# Patient Record
Sex: FEMALE | Race: WHITE | NOT HISPANIC OR LATINO | Employment: OTHER | URBAN - METROPOLITAN AREA
[De-identification: names, ages, dates, MRNs, and addresses within clinical notes are randomized per-mention and may not be internally consistent; named-entity substitution may affect disease eponyms.]

---

## 2017-01-13 ENCOUNTER — HOSPITAL ENCOUNTER (OUTPATIENT)
Facility: HOSPITAL | Age: 37
End: 2017-01-13

## 2017-06-07 ENCOUNTER — APPOINTMENT (OUTPATIENT)
Dept: LAB | Facility: HOSPITAL | Age: 37
End: 2017-06-07

## 2017-06-07 ENCOUNTER — TRANSCRIBE ORDERS (OUTPATIENT)
Dept: LAB | Facility: HOSPITAL | Age: 37
End: 2017-06-07

## 2017-06-07 DIAGNOSIS — N92.6 MISSED PERIOD: Primary | ICD-10-CM

## 2017-06-07 LAB
HCG INTACT+B SERPL-ACNC: NORMAL MIU/ML
PROGEST SERPL-MCNC: 29.46 NG/ML

## 2017-06-07 PROCEDURE — 84702 CHORIONIC GONADOTROPIN TEST: CPT | Performed by: PHYSICIAN ASSISTANT

## 2017-06-07 PROCEDURE — 84144 ASSAY OF PROGESTERONE: CPT | Performed by: PHYSICIAN ASSISTANT

## 2017-06-07 PROCEDURE — 36415 COLL VENOUS BLD VENIPUNCTURE: CPT | Performed by: PHYSICIAN ASSISTANT

## 2017-07-12 ENCOUNTER — LAB (OUTPATIENT)
Dept: LAB | Facility: HOSPITAL | Age: 37
End: 2017-07-12

## 2017-07-12 ENCOUNTER — TRANSCRIBE ORDERS (OUTPATIENT)
Dept: LAB | Facility: HOSPITAL | Age: 37
End: 2017-07-12

## 2017-07-12 DIAGNOSIS — Z34.81 PRENATAL CARE, SUBSEQUENT PREGNANCY, FIRST TRIMESTER: ICD-10-CM

## 2017-07-12 DIAGNOSIS — Z3A.10 10 WEEKS GESTATION OF PREGNANCY: Primary | ICD-10-CM

## 2017-07-12 DIAGNOSIS — Z3A.10 10 WEEKS GESTATION OF PREGNANCY: ICD-10-CM

## 2017-07-12 LAB
ABO GROUP BLD: NORMAL
AMPHET+METHAMPHET UR QL: NEGATIVE
AMPHETAMINES UR QL: NEGATIVE
BACTERIA UR QL AUTO: ABNORMAL /HPF
BARBITURATES UR QL SCN: NEGATIVE
BASOPHILS # BLD AUTO: 0.01 10*3/MM3 (ref 0–0.2)
BASOPHILS NFR BLD AUTO: 0.2 % (ref 0–1)
BENZODIAZ UR QL SCN: NEGATIVE
BILIRUB UR QL STRIP: NEGATIVE
BILIRUB UR QL STRIP: NEGATIVE
BLD GP AB SCN SERPL QL: NEGATIVE
BUPRENORPHINE SERPL-MCNC: NEGATIVE NG/ML
CANNABINOIDS SERPL QL: NEGATIVE
CLARITY UR: CLEAR
CLARITY UR: CLEAR
COCAINE UR QL: NEGATIVE
COLOR UR: YELLOW
COLOR UR: YELLOW
DEPRECATED RDW RBC AUTO: 42.2 FL (ref 37–54)
EOSINOPHIL # BLD AUTO: 0.04 10*3/MM3 (ref 0–0.3)
EOSINOPHIL NFR BLD AUTO: 1 % (ref 0–3)
ERYTHROCYTE [DISTWIDTH] IN BLOOD BY AUTOMATED COUNT: 12.3 % (ref 11.3–14.5)
EXTERNAL HEPATITIS B SURFACE ANTIGEN: NEGATIVE
EXTERNAL HEPATITIS C, RNA QUANT PCR: NON REACTIVE
EXTERNAL RUBELLA QUALITATIVE: NORMAL
EXTERNAL SYPHILIS RPR SCREEN: NORMAL
EXTERNAL URINE DRUG SCREEN: NEGATIVE
GLUCOSE BLD-MCNC: 95 MG/DL (ref 70–100)
GLUCOSE UR STRIP-MCNC: NEGATIVE MG/DL
GLUCOSE UR STRIP-MCNC: NEGATIVE MG/DL
HBV SURFACE AG SERPL QL IA: NORMAL
HCT VFR BLD AUTO: 40.3 % (ref 34.5–44)
HCV AB SER DONR QL: NORMAL
HGB BLD-MCNC: 13.1 G/DL (ref 11.5–15.5)
HGB UR QL STRIP.AUTO: NEGATIVE
HGB UR QL STRIP.AUTO: NEGATIVE
HIV1 P24 AG SERPL QL IA: NORMAL
HIV1+2 AB SER QL: NORMAL
HYALINE CASTS UR QL AUTO: ABNORMAL /LPF
IMM GRANULOCYTES # BLD: 0.01 10*3/MM3 (ref 0–0.03)
IMM GRANULOCYTES NFR BLD: 0.2 % (ref 0–0.6)
KETONES UR QL STRIP: NEGATIVE
KETONES UR QL STRIP: NEGATIVE
LEUKOCYTE ESTERASE UR QL STRIP.AUTO: ABNORMAL
LEUKOCYTE ESTERASE UR QL STRIP.AUTO: ABNORMAL
LYMPHOCYTES # BLD AUTO: 1.24 10*3/MM3 (ref 0.6–4.8)
LYMPHOCYTES NFR BLD AUTO: 30.7 % (ref 24–44)
MCH RBC QN AUTO: 30.3 PG (ref 27–31)
MCHC RBC AUTO-ENTMCNC: 32.5 G/DL (ref 32–36)
MCV RBC AUTO: 93.1 FL (ref 80–99)
METHADONE UR QL SCN: NEGATIVE
MONOCYTES # BLD AUTO: 0.34 10*3/MM3 (ref 0–1)
MONOCYTES NFR BLD AUTO: 8.4 % (ref 0–12)
NEUTROPHILS # BLD AUTO: 2.4 10*3/MM3 (ref 1.5–8.3)
NEUTROPHILS NFR BLD AUTO: 59.5 % (ref 41–71)
NITRITE UR QL STRIP: NEGATIVE
NITRITE UR QL STRIP: NEGATIVE
OPIATES UR QL: NEGATIVE
OXYCODONE UR QL SCN: NEGATIVE
PCP UR QL SCN: NEGATIVE
PH UR STRIP.AUTO: 7.5 [PH] (ref 5–8)
PH UR STRIP.AUTO: 7.5 [PH] (ref 5–8)
PLATELET # BLD AUTO: 157 10*3/MM3 (ref 150–450)
PMV BLD AUTO: 10.6 FL (ref 6–12)
PROPOXYPH UR QL: NEGATIVE
PROT UR QL STRIP: NEGATIVE
PROT UR QL STRIP: NEGATIVE
RBC # BLD AUTO: 4.33 10*6/MM3 (ref 3.89–5.14)
RBC # UR: ABNORMAL /HPF
REF LAB TEST METHOD: ABNORMAL
RH BLD: POSITIVE
RUBV IGG SER QL: NORMAL
RUBV IGG SER-ACNC: 14 IU/ML
SP GR UR STRIP: <=1.005 (ref 1–1.03)
SP GR UR STRIP: <=1.005 (ref 1–1.03)
SQUAMOUS #/AREA URNS HPF: ABNORMAL /HPF
TRICYCLICS UR QL SCN: NEGATIVE
UROBILINOGEN UR QL STRIP: ABNORMAL
UROBILINOGEN UR QL STRIP: ABNORMAL
WBC NRBC COR # BLD: 4.04 10*3/MM3 (ref 3.5–10.8)
WBC UR QL AUTO: ABNORMAL /HPF

## 2017-07-12 PROCEDURE — 82947 ASSAY GLUCOSE BLOOD QUANT: CPT | Performed by: OBSTETRICS & GYNECOLOGY

## 2017-07-12 PROCEDURE — 80081 OBSTETRIC PANEL INC HIV TSTG: CPT | Performed by: OBSTETRICS & GYNECOLOGY

## 2017-07-12 PROCEDURE — 36415 COLL VENOUS BLD VENIPUNCTURE: CPT

## 2017-07-12 PROCEDURE — 86850 RBC ANTIBODY SCREEN: CPT | Performed by: OBSTETRICS & GYNECOLOGY

## 2017-07-12 PROCEDURE — 85025 COMPLETE CBC W/AUTO DIFF WBC: CPT | Performed by: OBSTETRICS & GYNECOLOGY

## 2017-07-12 PROCEDURE — 80306 DRUG TEST PRSMV INSTRMNT: CPT | Performed by: OBSTETRICS & GYNECOLOGY

## 2017-07-12 PROCEDURE — 87086 URINE CULTURE/COLONY COUNT: CPT | Performed by: OBSTETRICS & GYNECOLOGY

## 2017-07-12 PROCEDURE — 87340 HEPATITIS B SURFACE AG IA: CPT | Performed by: OBSTETRICS & GYNECOLOGY

## 2017-07-12 PROCEDURE — 86803 HEPATITIS C AB TEST: CPT | Performed by: OBSTETRICS & GYNECOLOGY

## 2017-07-12 PROCEDURE — 86900 BLOOD TYPING SEROLOGIC ABO: CPT | Performed by: OBSTETRICS & GYNECOLOGY

## 2017-07-12 PROCEDURE — 81001 URINALYSIS AUTO W/SCOPE: CPT | Performed by: OBSTETRICS & GYNECOLOGY

## 2017-07-12 PROCEDURE — 86901 BLOOD TYPING SEROLOGIC RH(D): CPT | Performed by: OBSTETRICS & GYNECOLOGY

## 2017-07-12 PROCEDURE — 81003 URINALYSIS AUTO W/O SCOPE: CPT | Performed by: OBSTETRICS & GYNECOLOGY

## 2017-07-12 PROCEDURE — G0432 EIA HIV-1/HIV-2 SCREEN: HCPCS | Performed by: OBSTETRICS & GYNECOLOGY

## 2017-07-14 LAB
BACTERIA SPEC AEROBE CULT: NORMAL
RPR SER QL: NORMAL

## 2017-11-15 ENCOUNTER — LAB (OUTPATIENT)
Dept: LAB | Facility: HOSPITAL | Age: 37
End: 2017-11-15

## 2017-11-15 ENCOUNTER — TRANSCRIBE ORDERS (OUTPATIENT)
Dept: LAB | Facility: HOSPITAL | Age: 37
End: 2017-11-15

## 2017-11-15 DIAGNOSIS — Z34.83 PRENATAL CARE, SUBSEQUENT PREGNANCY, THIRD TRIMESTER: ICD-10-CM

## 2017-11-15 DIAGNOSIS — Z3A.28 28 WEEKS GESTATION OF PREGNANCY: Primary | ICD-10-CM

## 2017-11-15 DIAGNOSIS — Z3A.28 28 WEEKS GESTATION OF PREGNANCY: ICD-10-CM

## 2017-11-15 LAB
BLD GP AB SCN SERPL QL: NEGATIVE
DEPRECATED RDW RBC AUTO: 46.5 FL (ref 37–54)
ERYTHROCYTE [DISTWIDTH] IN BLOOD BY AUTOMATED COUNT: 13.6 % (ref 11.3–14.5)
EXTERNAL GTT 1 HOUR: 139
GLUCOSE 1H P 100 G GLC PO SERPL-MCNC: 122 MG/DL (ref 65–199)
GLUCOSE BLDC-MCNC: 139 MG/DL (ref 75–125)
HCT VFR BLD AUTO: 37.1 % (ref 34.5–44)
HGB BLD-MCNC: 12.1 G/DL (ref 11.5–15.5)
MCH RBC QN AUTO: 30.2 PG (ref 27–31)
MCHC RBC AUTO-ENTMCNC: 32.6 G/DL (ref 32–36)
MCV RBC AUTO: 92.5 FL (ref 80–99)
PLATELET # BLD AUTO: 143 10*3/MM3 (ref 150–450)
PMV BLD AUTO: 10.6 FL (ref 6–12)
RBC # BLD AUTO: 4.01 10*6/MM3 (ref 3.89–5.14)
WBC NRBC COR # BLD: 7.67 10*3/MM3 (ref 3.5–10.8)

## 2017-11-15 PROCEDURE — 86850 RBC ANTIBODY SCREEN: CPT | Performed by: OBSTETRICS & GYNECOLOGY

## 2017-11-15 PROCEDURE — 82950 GLUCOSE TEST: CPT | Performed by: OBSTETRICS & GYNECOLOGY

## 2017-11-15 PROCEDURE — 85027 COMPLETE CBC AUTOMATED: CPT | Performed by: OBSTETRICS & GYNECOLOGY

## 2017-11-15 PROCEDURE — 82962 GLUCOSE BLOOD TEST: CPT | Performed by: OBSTETRICS & GYNECOLOGY

## 2017-11-15 PROCEDURE — 36415 COLL VENOUS BLD VENIPUNCTURE: CPT | Performed by: OBSTETRICS & GYNECOLOGY

## 2018-01-15 LAB — EXTERNAL GROUP B STREP ANTIGEN: NEGATIVE

## 2018-01-30 ENCOUNTER — HOSPITAL ENCOUNTER (OUTPATIENT)
Facility: HOSPITAL | Age: 38
Setting detail: OBSERVATION
Discharge: HOME | End: 2018-01-30
Attending: OBSTETRICS & GYNECOLOGY | Admitting: OBSTETRICS & GYNECOLOGY

## 2018-01-30 VITALS
BODY MASS INDEX: 26.46 KG/M2 | RESPIRATION RATE: 16 BRPM | TEMPERATURE: 97.9 F | SYSTOLIC BLOOD PRESSURE: 135 MMHG | HEART RATE: 88 BPM | HEIGHT: 64 IN | WEIGHT: 155 LBS | DIASTOLIC BLOOD PRESSURE: 64 MMHG

## 2018-01-30 PROBLEM — Z34.90 PREGNANCY: Status: ACTIVE | Noted: 2018-01-30

## 2018-01-30 PROCEDURE — G0378 HOSPITAL OBSERVATION PER HR: HCPCS

## 2018-01-30 PROCEDURE — 59025 FETAL NON-STRESS TEST: CPT

## 2018-01-30 RX ORDER — MISOPROSTOL 200 UG/1
800 TABLET ORAL AS NEEDED
Status: CANCELLED | OUTPATIENT
Start: 2018-01-30

## 2018-01-30 RX ORDER — PRENATAL WITH FERROUS FUM AND FOLIC ACID 3080; 920; 120; 400; 22; 1.84; 3; 20; 10; 1; 12; 200; 27; 25; 2 [IU]/1; [IU]/1; MG/1; [IU]/1; MG/1; MG/1; MG/1; MG/1; MG/1; MG/1; UG/1; MG/1; MG/1; MG/1; MG/1
1 TABLET ORAL DAILY
COMMUNITY
End: 2019-06-17

## 2018-01-30 RX ORDER — CARBOPROST TROMETHAMINE 250 UG/ML
250 INJECTION, SOLUTION INTRAMUSCULAR AS NEEDED
Status: CANCELLED | OUTPATIENT
Start: 2018-01-30

## 2018-01-30 RX ORDER — SODIUM CHLORIDE 0.9 % (FLUSH) 0.9 %
1-10 SYRINGE (ML) INJECTION AS NEEDED
Status: CANCELLED | OUTPATIENT
Start: 2018-01-30

## 2018-01-30 RX ORDER — OXYTOCIN/RINGER'S LACTATE 20/1000 ML
125 PLASTIC BAG, INJECTION (ML) INTRAVENOUS CONTINUOUS PRN
Status: CANCELLED | OUTPATIENT
Start: 2018-01-30

## 2018-01-30 RX ORDER — LIDOCAINE HYDROCHLORIDE 10 MG/ML
5 INJECTION, SOLUTION INFILTRATION; PERINEURAL AS NEEDED
Status: CANCELLED | OUTPATIENT
Start: 2018-01-30

## 2018-01-30 RX ORDER — OXYTOCIN/RINGER'S LACTATE 20/1000 ML
999 PLASTIC BAG, INJECTION (ML) INTRAVENOUS ONCE
Status: CANCELLED | OUTPATIENT
Start: 2018-01-30

## 2018-01-30 RX ORDER — METHYLERGONOVINE MALEATE 0.2 MG/ML
200 INJECTION INTRAVENOUS ONCE AS NEEDED
Status: CANCELLED | OUTPATIENT
Start: 2018-01-30

## 2018-01-30 RX ORDER — SODIUM CHLORIDE, SODIUM LACTATE, POTASSIUM CHLORIDE, CALCIUM CHLORIDE 600; 310; 30; 20 MG/100ML; MG/100ML; MG/100ML; MG/100ML
125 INJECTION, SOLUTION INTRAVENOUS CONTINUOUS
Status: CANCELLED | OUTPATIENT
Start: 2018-01-30

## 2018-01-30 RX ORDER — ALBUTEROL SULFATE 2.5 MG/3ML
2.5 SOLUTION RESPIRATORY (INHALATION) EVERY 4 HOURS PRN
COMMUNITY

## 2018-02-05 ENCOUNTER — APPOINTMENT (OUTPATIENT)
Dept: GENERAL RADIOLOGY | Facility: HOSPITAL | Age: 38
End: 2018-02-05

## 2018-02-05 ENCOUNTER — ANESTHESIA EVENT (OUTPATIENT)
Dept: LABOR AND DELIVERY | Facility: HOSPITAL | Age: 38
End: 2018-02-05

## 2018-02-05 ENCOUNTER — ANESTHESIA (OUTPATIENT)
Dept: LABOR AND DELIVERY | Facility: HOSPITAL | Age: 38
End: 2018-02-05

## 2018-02-05 ENCOUNTER — HOSPITAL ENCOUNTER (INPATIENT)
Dept: LABOR AND DELIVERY | Facility: HOSPITAL | Age: 38
LOS: 2 days | Discharge: HOME | End: 2018-02-07
Attending: OBSTETRICS & GYNECOLOGY | Admitting: OBSTETRICS & GYNECOLOGY

## 2018-02-05 DIAGNOSIS — O48.0 POSTMATURITY PREGNANCY, 40-42 WEEKS GESTATION: Primary | ICD-10-CM

## 2018-02-05 PROBLEM — Z34.90 PREGNANCY: Status: RESOLVED | Noted: 2018-01-30 | Resolved: 2018-02-05

## 2018-02-05 LAB
ABO GROUP BLD: NORMAL
ATMOSPHERIC PRESS: ABNORMAL MMHG
ATMOSPHERIC PRESS: ABNORMAL MMHG
BASE EXCESS BLDCOA CALC-SCNC: -6.6 MMOL/L (ref 0–2)
BASE EXCESS BLDCOV CALC-SCNC: -6 MMOL/L (ref 0–2)
BDY SITE: ABNORMAL
BLD GP AB SCN SERPL QL: NEGATIVE
CO2 BLDA-SCNC: 22.9 MMOL/L (ref 22–33)
CO2 BLDA-SCNC: 26.5 MMOL/L (ref 22–33)
DEPRECATED RDW RBC AUTO: 44.7 FL (ref 37–54)
ERYTHROCYTE [DISTWIDTH] IN BLOOD BY AUTOMATED COUNT: 13.7 % (ref 11.3–14.5)
HCO3 BLDCOA-SCNC: 24.4 MMOL/L (ref 16.9–20.5)
HCO3 BLDCOV-SCNC: 21.5 MMOL/L (ref 18.6–21.4)
HCT VFR BLD AUTO: 36.1 % (ref 34.5–44)
HGB BLD-MCNC: 12.2 G/DL (ref 11.5–15.5)
HGB BLDA-MCNC: 16.9 G/DL (ref 14–18)
HGB BLDA-MCNC: 16.9 G/DL (ref 14–18)
HOROWITZ INDEX BLD+IHG-RTO: 21 %
HOROWITZ INDEX BLD+IHG-RTO: 21 %
MCH RBC QN AUTO: 29.9 PG (ref 27–31)
MCHC RBC AUTO-ENTMCNC: 33.8 G/DL (ref 32–36)
MCV RBC AUTO: 88.5 FL (ref 80–99)
MODALITY: ABNORMAL
MODALITY: ABNORMAL
PCO2 BLDCOA: 71 MMHG (ref 43.3–54.9)
PCO2 BLDCOV: 48 MM HG (ref 30–60)
PH BLDCOA: 7.14 PH UNITS (ref 7.2–7.3)
PH BLDCOV: 7.26 PH UNITS (ref 7.19–7.46)
PLATELET # BLD AUTO: 122 10*3/MM3 (ref 150–450)
PMV BLD AUTO: 11.5 FL (ref 6–12)
PO2 BLDCOA: 11.4 MMHG (ref 11.5–43.3)
PO2 BLDCOV: 20.7 MM HG
RBC # BLD AUTO: 4.08 10*6/MM3 (ref 3.89–5.14)
RH BLD: POSITIVE
SAO2 % BLDCOA: 11.2 %
SAO2 % BLDCOA: ABNORMAL % (ref 45–75)
SAO2 % BLDCOV: 38.7 %
WBC NRBC COR # BLD: 6.68 10*3/MM3 (ref 3.5–10.8)

## 2018-02-05 PROCEDURE — 74018 RADEX ABDOMEN 1 VIEW: CPT

## 2018-02-05 PROCEDURE — 25010000002 MIDAZOLAM PER 1 MG: Performed by: ANESTHESIOLOGY

## 2018-02-05 PROCEDURE — 25010000002 CHLOROPROCAINE HCL (PF) 3 % SOLUTION: Performed by: ANESTHESIOLOGY

## 2018-02-05 PROCEDURE — 59514 CESAREAN DELIVERY ONLY: CPT | Performed by: OBSTETRICS & GYNECOLOGY

## 2018-02-05 PROCEDURE — 25010000002 DIPHENHYDRAMINE PER 50 MG: Performed by: OBSTETRICS & GYNECOLOGY

## 2018-02-05 PROCEDURE — 25010000002 FENTANYL CITRATE (PF) 100 MCG/2ML SOLUTION: Performed by: ANESTHESIOLOGY

## 2018-02-05 PROCEDURE — 82805 BLOOD GASES W/O2 SATURATION: CPT | Performed by: OBSTETRICS & GYNECOLOGY

## 2018-02-05 PROCEDURE — 3E033VJ INTRODUCTION OF OTHER HORMONE INTO PERIPHERAL VEIN, PERCUTANEOUS APPROACH: ICD-10-PCS | Performed by: OBSTETRICS & GYNECOLOGY

## 2018-02-05 PROCEDURE — C1755 CATHETER, INTRASPINAL: HCPCS

## 2018-02-05 PROCEDURE — 25010000002 METHYLERGONOVINE MALEATE PER 0.2 MG: Performed by: ANESTHESIOLOGY

## 2018-02-05 PROCEDURE — 85027 COMPLETE CBC AUTOMATED: CPT | Performed by: OBSTETRICS & GYNECOLOGY

## 2018-02-05 PROCEDURE — 10907ZC DRAINAGE OF AMNIOTIC FLUID, THERAPEUTIC FROM PRODUCTS OF CONCEPTION, VIA NATURAL OR ARTIFICIAL OPENING: ICD-10-PCS | Performed by: OBSTETRICS & GYNECOLOGY

## 2018-02-05 PROCEDURE — C1755 CATHETER, INTRASPINAL: HCPCS | Performed by: ANESTHESIOLOGY

## 2018-02-05 PROCEDURE — 86850 RBC ANTIBODY SCREEN: CPT | Performed by: OBSTETRICS & GYNECOLOGY

## 2018-02-05 PROCEDURE — 25010000003 MORPHINE PER 10 MG: Performed by: ANESTHESIOLOGY

## 2018-02-05 PROCEDURE — 25010000002 ROPIVACAINE PER 1 MG: Performed by: ANESTHESIOLOGY

## 2018-02-05 PROCEDURE — 25010000002 ONDANSETRON PER 1 MG: Performed by: ANESTHESIOLOGY

## 2018-02-05 PROCEDURE — 59025 FETAL NON-STRESS TEST: CPT

## 2018-02-05 PROCEDURE — 86900 BLOOD TYPING SEROLOGIC ABO: CPT | Performed by: OBSTETRICS & GYNECOLOGY

## 2018-02-05 PROCEDURE — 86901 BLOOD TYPING SEROLOGIC RH(D): CPT | Performed by: OBSTETRICS & GYNECOLOGY

## 2018-02-05 RX ORDER — HYDROCODONE BITARTRATE AND ACETAMINOPHEN 5; 325 MG/1; MG/1
1 TABLET ORAL EVERY 4 HOURS PRN
Status: DISCONTINUED | OUTPATIENT
Start: 2018-02-05 | End: 2018-02-07 | Stop reason: HOSPADM

## 2018-02-05 RX ORDER — ONDANSETRON 4 MG/1
4 TABLET, FILM COATED ORAL EVERY 6 HOURS PRN
Status: DISCONTINUED | OUTPATIENT
Start: 2018-02-05 | End: 2018-02-05

## 2018-02-05 RX ORDER — OXYTOCIN/RINGER'S LACTATE 20/1000 ML
125 PLASTIC BAG, INJECTION (ML) INTRAVENOUS CONTINUOUS PRN
Status: DISCONTINUED | OUTPATIENT
Start: 2018-02-05 | End: 2018-02-05 | Stop reason: HOSPADM

## 2018-02-05 RX ORDER — ONDANSETRON 2 MG/ML
4 INJECTION INTRAMUSCULAR; INTRAVENOUS ONCE AS NEEDED
Status: DISCONTINUED | OUTPATIENT
Start: 2018-02-05 | End: 2018-02-05 | Stop reason: HOSPADM

## 2018-02-05 RX ORDER — MIDAZOLAM HYDROCHLORIDE 1 MG/ML
INJECTION INTRAMUSCULAR; INTRAVENOUS AS NEEDED
Status: DISCONTINUED | OUTPATIENT
Start: 2018-02-05 | End: 2018-02-05 | Stop reason: SURG

## 2018-02-05 RX ORDER — MORPHINE SULFATE 2 MG/ML
1 INJECTION, SOLUTION INTRAMUSCULAR; INTRAVENOUS EVERY 4 HOURS PRN
Status: DISCONTINUED | OUTPATIENT
Start: 2018-02-05 | End: 2018-02-07 | Stop reason: HOSPADM

## 2018-02-05 RX ORDER — ACETAMINOPHEN 325 MG/1
650 TABLET ORAL EVERY 4 HOURS PRN
Status: DISCONTINUED | OUTPATIENT
Start: 2018-02-05 | End: 2018-02-05

## 2018-02-05 RX ORDER — METHYLERGONOVINE MALEATE 0.2 MG/ML
INJECTION INTRAVENOUS AS NEEDED
Status: DISCONTINUED | OUTPATIENT
Start: 2018-02-05 | End: 2018-02-05 | Stop reason: SURG

## 2018-02-05 RX ORDER — HYDROMORPHONE HYDROCHLORIDE 1 MG/ML
0.5 INJECTION, SOLUTION INTRAMUSCULAR; INTRAVENOUS; SUBCUTANEOUS
Status: ACTIVE | OUTPATIENT
Start: 2018-02-05 | End: 2018-02-06

## 2018-02-05 RX ORDER — CHLOROPROCAINE HYDROCHLORIDE 30 MG/ML
INJECTION, SOLUTION EPIDURAL; INFILTRATION; INTRACAUDAL; PERINEURAL AS NEEDED
Status: DISCONTINUED | OUTPATIENT
Start: 2018-02-05 | End: 2018-02-05 | Stop reason: SURG

## 2018-02-05 RX ORDER — DIPHENHYDRAMINE HCL 25 MG
25 CAPSULE ORAL EVERY 6 HOURS PRN
Status: DISCONTINUED | OUTPATIENT
Start: 2018-02-05 | End: 2018-02-07 | Stop reason: HOSPADM

## 2018-02-05 RX ORDER — MONTELUKAST SODIUM 10 MG/1
10 TABLET ORAL NIGHTLY
COMMUNITY
End: 2019-06-17

## 2018-02-05 RX ORDER — NALOXONE HCL 0.4 MG/ML
0.4 VIAL (ML) INJECTION
Status: DISCONTINUED | OUTPATIENT
Start: 2018-02-05 | End: 2018-02-07 | Stop reason: HOSPADM

## 2018-02-05 RX ORDER — DIPHENHYDRAMINE HYDROCHLORIDE 50 MG/ML
12.5 INJECTION INTRAMUSCULAR; INTRAVENOUS EVERY 8 HOURS PRN
Status: DISCONTINUED | OUTPATIENT
Start: 2018-02-05 | End: 2018-02-05 | Stop reason: HOSPADM

## 2018-02-05 RX ORDER — IBUPROFEN 600 MG/1
600 TABLET ORAL ONCE AS NEEDED
Status: DISCONTINUED | OUTPATIENT
Start: 2018-02-05 | End: 2018-02-07 | Stop reason: HOSPADM

## 2018-02-05 RX ORDER — DIPHENHYDRAMINE HYDROCHLORIDE 50 MG/ML
25 INJECTION INTRAMUSCULAR; INTRAVENOUS EVERY 6 HOURS PRN
Status: DISCONTINUED | OUTPATIENT
Start: 2018-02-05 | End: 2018-02-05

## 2018-02-05 RX ORDER — ROPIVACAINE HYDROCHLORIDE 2 MG/ML
15 INJECTION, SOLUTION EPIDURAL; INFILTRATION; PERINEURAL CONTINUOUS
Status: DISCONTINUED | OUTPATIENT
Start: 2018-02-05 | End: 2018-02-05

## 2018-02-05 RX ORDER — PROMETHAZINE HYDROCHLORIDE 25 MG/ML
12.5 INJECTION, SOLUTION INTRAMUSCULAR; INTRAVENOUS EVERY 6 HOURS PRN
Status: DISCONTINUED | OUTPATIENT
Start: 2018-02-05 | End: 2018-02-07 | Stop reason: HOSPADM

## 2018-02-05 RX ORDER — ACETAMINOPHEN 325 MG/1
650 TABLET ORAL ONCE AS NEEDED
Status: DISCONTINUED | OUTPATIENT
Start: 2018-02-05 | End: 2018-02-07 | Stop reason: HOSPADM

## 2018-02-05 RX ORDER — DIPHENHYDRAMINE HYDROCHLORIDE 50 MG/ML
12.5 INJECTION INTRAMUSCULAR; INTRAVENOUS EVERY 6 HOURS PRN
Status: DISCONTINUED | OUTPATIENT
Start: 2018-02-05 | End: 2018-02-07 | Stop reason: HOSPADM

## 2018-02-05 RX ORDER — METOCLOPRAMIDE HYDROCHLORIDE 5 MG/ML
10 INJECTION INTRAMUSCULAR; INTRAVENOUS ONCE AS NEEDED
Status: DISCONTINUED | OUTPATIENT
Start: 2018-02-05 | End: 2018-02-05 | Stop reason: HOSPADM

## 2018-02-05 RX ORDER — SIMETHICONE 80 MG
80 TABLET,CHEWABLE ORAL
Status: DISCONTINUED | OUTPATIENT
Start: 2018-02-05 | End: 2018-02-07 | Stop reason: HOSPADM

## 2018-02-05 RX ORDER — ONDANSETRON 2 MG/ML
4 INJECTION INTRAMUSCULAR; INTRAVENOUS ONCE
Status: DISCONTINUED | OUTPATIENT
Start: 2018-02-05 | End: 2018-02-07 | Stop reason: HOSPADM

## 2018-02-05 RX ORDER — METHYLERGONOVINE MALEATE 0.2 MG/ML
200 INJECTION INTRAVENOUS ONCE AS NEEDED
Status: DISCONTINUED | OUTPATIENT
Start: 2018-02-05 | End: 2018-02-07 | Stop reason: HOSPADM

## 2018-02-05 RX ORDER — LIDOCAINE HYDROCHLORIDE 10 MG/ML
5 INJECTION, SOLUTION EPIDURAL; INFILTRATION; INTRACAUDAL; PERINEURAL AS NEEDED
Status: DISCONTINUED | OUTPATIENT
Start: 2018-02-05 | End: 2018-02-05

## 2018-02-05 RX ORDER — OXYTOCIN-SODIUM CHLORIDE 0.9% IV SOLN 30 UNIT/500ML 30-0.9/5 UT/ML-%
2-30 SOLUTION INTRAVENOUS
Status: DISCONTINUED | OUTPATIENT
Start: 2018-02-05 | End: 2018-02-05

## 2018-02-05 RX ORDER — IBUPROFEN 600 MG/1
600 TABLET ORAL EVERY 6 HOURS PRN
Status: DISCONTINUED | OUTPATIENT
Start: 2018-02-05 | End: 2018-02-07 | Stop reason: HOSPADM

## 2018-02-05 RX ORDER — SODIUM CHLORIDE 0.9 % (FLUSH) 0.9 %
1-10 SYRINGE (ML) INJECTION AS NEEDED
Status: DISCONTINUED | OUTPATIENT
Start: 2018-02-05 | End: 2018-02-05

## 2018-02-05 RX ORDER — ONDANSETRON 2 MG/ML
INJECTION INTRAMUSCULAR; INTRAVENOUS AS NEEDED
Status: DISCONTINUED | OUTPATIENT
Start: 2018-02-05 | End: 2018-02-05 | Stop reason: SURG

## 2018-02-05 RX ORDER — OXYCODONE HYDROCHLORIDE AND ACETAMINOPHEN 5; 325 MG/1; MG/1
1 TABLET ORAL EVERY 4 HOURS PRN
Status: DISCONTINUED | OUTPATIENT
Start: 2018-02-05 | End: 2018-02-07 | Stop reason: HOSPADM

## 2018-02-05 RX ORDER — ACETAMINOPHEN 325 MG/1
650 TABLET ORAL EVERY 4 HOURS PRN
Status: DISCONTINUED | OUTPATIENT
Start: 2018-02-05 | End: 2018-02-07 | Stop reason: HOSPADM

## 2018-02-05 RX ORDER — PROMETHAZINE HYDROCHLORIDE 12.5 MG/1
12.5 SUPPOSITORY RECTAL EVERY 6 HOURS PRN
Status: DISCONTINUED | OUTPATIENT
Start: 2018-02-05 | End: 2018-02-07 | Stop reason: HOSPADM

## 2018-02-05 RX ORDER — LIDOCAINE HYDROCHLORIDE AND EPINEPHRINE 20; 5 MG/ML; UG/ML
INJECTION, SOLUTION EPIDURAL; INFILTRATION; INTRACAUDAL; PERINEURAL AS NEEDED
Status: DISCONTINUED | OUTPATIENT
Start: 2018-02-05 | End: 2018-02-05 | Stop reason: SURG

## 2018-02-05 RX ORDER — SODIUM CHLORIDE, SODIUM LACTATE, POTASSIUM CHLORIDE, CALCIUM CHLORIDE 600; 310; 30; 20 MG/100ML; MG/100ML; MG/100ML; MG/100ML
125 INJECTION, SOLUTION INTRAVENOUS CONTINUOUS
Status: DISCONTINUED | OUTPATIENT
Start: 2018-02-05 | End: 2018-02-05 | Stop reason: SDUPTHER

## 2018-02-05 RX ORDER — EPHEDRINE SULFATE 50 MG/ML
10 INJECTION, SOLUTION INTRAVENOUS
Status: DISCONTINUED | OUTPATIENT
Start: 2018-02-05 | End: 2018-02-05 | Stop reason: HOSPADM

## 2018-02-05 RX ORDER — LIDOCAINE HYDROCHLORIDE AND EPINEPHRINE 15; 5 MG/ML; UG/ML
INJECTION, SOLUTION EPIDURAL AS NEEDED
Status: DISCONTINUED | OUTPATIENT
Start: 2018-02-05 | End: 2018-02-05 | Stop reason: SURG

## 2018-02-05 RX ORDER — METOCLOPRAMIDE HYDROCHLORIDE 5 MG/ML
10 INJECTION INTRAMUSCULAR; INTRAVENOUS ONCE AS NEEDED
Status: DISCONTINUED | OUTPATIENT
Start: 2018-02-05 | End: 2018-02-07 | Stop reason: HOSPADM

## 2018-02-05 RX ORDER — CARBOPROST TROMETHAMINE 250 UG/ML
250 INJECTION, SOLUTION INTRAMUSCULAR AS NEEDED
Status: DISCONTINUED | OUTPATIENT
Start: 2018-02-05 | End: 2018-02-07 | Stop reason: HOSPADM

## 2018-02-05 RX ORDER — FAMOTIDINE 10 MG/ML
20 INJECTION, SOLUTION INTRAVENOUS ONCE AS NEEDED
Status: DISCONTINUED | OUTPATIENT
Start: 2018-02-05 | End: 2018-02-05 | Stop reason: HOSPADM

## 2018-02-05 RX ORDER — MORPHINE SULFATE 0.5 MG/ML
INJECTION, SOLUTION EPIDURAL; INTRATHECAL; INTRAVENOUS AS NEEDED
Status: DISCONTINUED | OUTPATIENT
Start: 2018-02-05 | End: 2018-02-05 | Stop reason: SURG

## 2018-02-05 RX ORDER — MAGNESIUM CARB/ALUMINUM HYDROX 105-160MG
30 TABLET,CHEWABLE ORAL ONCE
Status: DISCONTINUED | OUTPATIENT
Start: 2018-02-05 | End: 2018-02-05

## 2018-02-05 RX ORDER — OXYTOCIN/RINGER'S LACTATE 20/1000 ML
999 PLASTIC BAG, INJECTION (ML) INTRAVENOUS ONCE
Status: DISCONTINUED | OUTPATIENT
Start: 2018-02-05 | End: 2018-02-05 | Stop reason: HOSPADM

## 2018-02-05 RX ORDER — ONDANSETRON 2 MG/ML
4 INJECTION INTRAMUSCULAR; INTRAVENOUS EVERY 6 HOURS PRN
Status: DISCONTINUED | OUTPATIENT
Start: 2018-02-05 | End: 2018-02-05

## 2018-02-05 RX ORDER — FENTANYL CITRATE 50 UG/ML
INJECTION, SOLUTION INTRAMUSCULAR; INTRAVENOUS AS NEEDED
Status: DISCONTINUED | OUTPATIENT
Start: 2018-02-05 | End: 2018-02-05 | Stop reason: SURG

## 2018-02-05 RX ORDER — SODIUM CHLORIDE 0.9 % (FLUSH) 0.9 %
1-10 SYRINGE (ML) INJECTION AS NEEDED
Status: DISCONTINUED | OUTPATIENT
Start: 2018-02-05 | End: 2018-02-07 | Stop reason: HOSPADM

## 2018-02-05 RX ORDER — LANOLIN 100 %
OINTMENT (GRAM) TOPICAL
Status: DISCONTINUED | OUTPATIENT
Start: 2018-02-05 | End: 2018-02-07 | Stop reason: HOSPADM

## 2018-02-05 RX ORDER — MISOPROSTOL 200 UG/1
800 TABLET ORAL AS NEEDED
Status: DISCONTINUED | OUTPATIENT
Start: 2018-02-05 | End: 2018-02-07 | Stop reason: HOSPADM

## 2018-02-05 RX ORDER — BUDESONIDE AND FORMOTEROL FUMARATE DIHYDRATE 160; 4.5 UG/1; UG/1
2 AEROSOL RESPIRATORY (INHALATION)
Status: DISCONTINUED | OUTPATIENT
Start: 2018-02-05 | End: 2018-02-05

## 2018-02-05 RX ORDER — SODIUM CHLORIDE, SODIUM LACTATE, POTASSIUM CHLORIDE, CALCIUM CHLORIDE 600; 310; 30; 20 MG/100ML; MG/100ML; MG/100ML; MG/100ML
125 INJECTION, SOLUTION INTRAVENOUS CONTINUOUS
Status: DISCONTINUED | OUTPATIENT
Start: 2018-02-05 | End: 2018-02-05

## 2018-02-05 RX ORDER — PROMETHAZINE HYDROCHLORIDE 25 MG/1
25 TABLET ORAL EVERY 6 HOURS PRN
Status: DISCONTINUED | OUTPATIENT
Start: 2018-02-05 | End: 2018-02-07 | Stop reason: HOSPADM

## 2018-02-05 RX ORDER — OXYTOCIN 10 [USP'U]/ML
INJECTION, SOLUTION INTRAMUSCULAR; INTRAVENOUS AS NEEDED
Status: DISCONTINUED | OUTPATIENT
Start: 2018-02-05 | End: 2018-02-05 | Stop reason: SURG

## 2018-02-05 RX ORDER — DOCUSATE SODIUM 100 MG/1
100 CAPSULE, LIQUID FILLED ORAL 2 TIMES DAILY PRN
Status: DISCONTINUED | OUTPATIENT
Start: 2018-02-05 | End: 2018-02-07 | Stop reason: HOSPADM

## 2018-02-05 RX ORDER — SODIUM CHLORIDE, SODIUM LACTATE, POTASSIUM CHLORIDE, CALCIUM CHLORIDE 600; 310; 30; 20 MG/100ML; MG/100ML; MG/100ML; MG/100ML
INJECTION, SOLUTION INTRAVENOUS CONTINUOUS PRN
Status: DISCONTINUED | OUTPATIENT
Start: 2018-02-05 | End: 2018-02-05 | Stop reason: SURG

## 2018-02-05 RX ORDER — TRISODIUM CITRATE DIHYDRATE AND CITRIC ACID MONOHYDRATE 500; 334 MG/5ML; MG/5ML
30 SOLUTION ORAL ONCE
Status: DISCONTINUED | OUTPATIENT
Start: 2018-02-05 | End: 2018-02-05 | Stop reason: HOSPADM

## 2018-02-05 RX ORDER — NALOXONE HCL 0.4 MG/ML
0.4 VIAL (ML) INJECTION
Status: ACTIVE | OUTPATIENT
Start: 2018-02-05 | End: 2018-02-06

## 2018-02-05 RX ADMIN — METHYLERGONOVINE MALEATE 200 MCG: 0.2 INJECTION INTRAVENOUS at 15:04

## 2018-02-05 RX ADMIN — SODIUM CHLORIDE, POTASSIUM CHLORIDE, SODIUM LACTATE AND CALCIUM CHLORIDE 999 ML/HR: 600; 310; 30; 20 INJECTION, SOLUTION INTRAVENOUS at 12:02

## 2018-02-05 RX ADMIN — ONDANSETRON 4 MG: 2 INJECTION INTRAMUSCULAR; INTRAVENOUS at 15:25

## 2018-02-05 RX ADMIN — OXYCODONE AND ACETAMINOPHEN 1 TABLET: 5; 325 TABLET ORAL at 17:15

## 2018-02-05 RX ADMIN — SODIUM CHLORIDE, POTASSIUM CHLORIDE, SODIUM LACTATE AND CALCIUM CHLORIDE: 600; 310; 30; 20 INJECTION, SOLUTION INTRAVENOUS at 15:35

## 2018-02-05 RX ADMIN — SODIUM CHLORIDE, POTASSIUM CHLORIDE, SODIUM LACTATE AND CALCIUM CHLORIDE 125 ML/HR: 600; 310; 30; 20 INJECTION, SOLUTION INTRAVENOUS at 06:55

## 2018-02-05 RX ADMIN — FENTANYL CITRATE 100 MCG: 50 INJECTION, SOLUTION INTRAMUSCULAR; INTRAVENOUS at 12:42

## 2018-02-05 RX ADMIN — MIDAZOLAM HYDROCHLORIDE 2 MG: 1 INJECTION, SOLUTION INTRAMUSCULAR; INTRAVENOUS at 15:05

## 2018-02-05 RX ADMIN — ROPIVACAINE HYDROCHLORIDE 15 ML/HR: 2 INJECTION, SOLUTION EPIDURAL; INFILTRATION at 12:43

## 2018-02-05 RX ADMIN — LIDOCAINE HYDROCHLORIDE,EPINEPHRINE BITARTRATE 10 ML: 20; .005 INJECTION, SOLUTION EPIDURAL; INFILTRATION; INTRACAUDAL; PERINEURAL at 14:45

## 2018-02-05 RX ADMIN — ROPIVACAINE HYDROCHLORIDE 10 ML: 5 INJECTION, SOLUTION EPIDURAL; INFILTRATION; PERINEURAL at 12:43

## 2018-02-05 RX ADMIN — EPHEDRINE SULFATE 10 MG: 50 INJECTION INTRAMUSCULAR; INTRAVENOUS; SUBCUTANEOUS at 13:45

## 2018-02-05 RX ADMIN — EPHEDRINE SULFATE 10 MG: 50 INJECTION INTRAMUSCULAR; INTRAVENOUS; SUBCUTANEOUS at 13:30

## 2018-02-05 RX ADMIN — ROPIVACAINE HYDROCHLORIDE 10 ML: 5 INJECTION, SOLUTION EPIDURAL; INFILTRATION; PERINEURAL at 13:10

## 2018-02-05 RX ADMIN — OXYTOCIN 2 MILLI-UNITS/MIN: 10 INJECTION INTRAVENOUS at 07:38

## 2018-02-05 RX ADMIN — LIDOCAINE HYDROCHLORIDE AND EPINEPHRINE 3 ML: 15; 5 INJECTION, SOLUTION EPIDURAL at 13:08

## 2018-02-05 RX ADMIN — SODIUM CHLORIDE, POTASSIUM CHLORIDE, SODIUM LACTATE AND CALCIUM CHLORIDE: 600; 310; 30; 20 INJECTION, SOLUTION INTRAVENOUS at 15:00

## 2018-02-05 RX ADMIN — SODIUM CHLORIDE, POTASSIUM CHLORIDE, SODIUM LACTATE AND CALCIUM CHLORIDE 125 ML/HR: 600; 310; 30; 20 INJECTION, SOLUTION INTRAVENOUS at 12:41

## 2018-02-05 RX ADMIN — OXYTOCIN 40 UNITS: 10 INJECTION, SOLUTION INTRAMUSCULAR; INTRAVENOUS at 15:00

## 2018-02-05 RX ADMIN — OXYTOCIN 20 UNITS: 10 INJECTION, SOLUTION INTRAMUSCULAR; INTRAVENOUS at 15:35

## 2018-02-05 RX ADMIN — MORPHINE SULFATE 4 MG: 0.5 INJECTION, SOLUTION EPIDURAL; INTRATHECAL; INTRAVENOUS at 15:25

## 2018-02-05 RX ADMIN — CHLOROPROCAINE HYDROCHLORIDE 10 ML: 30 INJECTION, SOLUTION EPIDURAL; INFILTRATION; INTRACAUDAL; PERINEURAL at 14:52

## 2018-02-05 RX ADMIN — IBUPROFEN 600 MG: 600 TABLET, FILM COATED ORAL at 20:47

## 2018-02-05 RX ADMIN — EPHEDRINE SULFATE 10 MG: 50 INJECTION INTRAMUSCULAR; INTRAVENOUS; SUBCUTANEOUS at 15:00

## 2018-02-05 RX ADMIN — LIDOCAINE HYDROCHLORIDE AND EPINEPHRINE 3 ML: 15; 5 INJECTION, SOLUTION EPIDURAL at 12:39

## 2018-02-05 RX ADMIN — OXYCODONE AND ACETAMINOPHEN 1 TABLET: 5; 325 TABLET ORAL at 20:47

## 2018-02-05 RX ADMIN — DIPHENHYDRAMINE HYDROCHLORIDE 12.5 MG: 50 INJECTION INTRAMUSCULAR; INTRAVENOUS at 19:07

## 2018-02-05 NOTE — ANESTHESIA PROCEDURE NOTES
Labor Epidural    Patient location during procedure: OB  Performed By  Anesthesiologist: DEYSI LANDIS  Preanesthetic Checklist  Completed: patient identified, site marked, surgical consent, pre-op evaluation, timeout performed, IV checked, risks and benefits discussed and monitors and equipment checked  Additional Notes  Epidural replaced at 1300.  First epidural was only partially effective.  Prep:  Pt Position:sitting  Sterile Tech:gloves, mask, sterile barrier and cap  Prep:DuraPrep  Monitoring:blood pressure monitoring  Epidural Block Procedure:  Approach:midline  Guidance:landmark technique and palpation technique  Location:L2-L3  Needle Type:Tuohy  Needle Gauge:17 G  Loss of Resistance Medium: air  Loss of Resistance: 4cm  Cath Depth at skin:9 cm  Paresthesia: none  Aspiration:negative  Test Dose:negative  Number of Attempts: 1  Post Assessment:  Dressing:secured with tape and occlusive dressing applied (Tegaderm Placed)  Pt Tolerance:patient tolerated the procedure well with no apparent complications  Complications:no

## 2018-02-05 NOTE — PLAN OF CARE
Problem: Patient Care Overview (Adult)  Goal: Plan of Care Review  Outcome: Ongoing (interventions implemented as appropriate)      Problem: Breastfeeding (Adult,NICU,[place],Obstetrics,Pediatric)  Goal: Signs and Symptoms of Listed Potential Problems Will be Absent or Manageable (Breastfeeding)  Outcome: Ongoing (interventions implemented as appropriate)

## 2018-02-05 NOTE — LACTATION NOTE
This note was copied from a baby's chart.     02/05/18 1800   Maternal Information   Date of Referral 02/05/18   Person Making Referral other (see comments)  (courtesy)   Maternal Infant Feeding   Maternal Emotional State anxious   Previous Breastfeeding History yes  (latch issues, pumped 3 months)   Current Delivery Breastfeeding History   Currently Breastfeeding no  (baby in OBS)   Equipment Type/Education   Breast Pump Type double electric, personal

## 2018-02-05 NOTE — ANESTHESIA PROCEDURE NOTES
Labor Epidural    Patient location during procedure: OB  Performed By  Anesthesiologist: DEYSI LANDIS  Preanesthetic Checklist  Completed: patient identified, site marked, surgical consent, pre-op evaluation, timeout performed, IV checked, risks and benefits discussed and monitors and equipment checked  Prep:  Pt Position:sitting  Sterile Tech:gloves, mask, sterile barrier and cap  Prep:DuraPrep  Monitoring:blood pressure monitoring  Epidural Block Procedure:  Approach:midline  Guidance:landmark technique and palpation technique  Needle Type:Tuohy  Needle Gauge:17 G  Loss of Resistance Medium: air  Loss of Resistance: 4cm  Cath Depth at skin:9 cm  Paresthesia: none  Aspiration:negative  Test Dose:negative  Number of Attempts: 1  Post Assessment:  Dressing:secured with tape and occlusive dressing applied (Tegaderm Placed)  Pt Tolerance:patient tolerated the procedure well with no apparent complications  Complications:no

## 2018-02-05 NOTE — OP NOTE
[facility]   Section Operative Note    Pre-Operative Dx:   1.  IUP at 40 weeks IUP   2. fetal distress        Postoperative dx:    1.  Same     Procedure: Procedure(s):   SECTION PRIMARY   Surgeon: Mariam Kim DO    Assistant: El Isaacs   Anesthesia: Epidural    EBL:    mls.  800 mL mls.         IV Fluids: 1700 mls.   UOP: 200mls.       Antibiotics: cefazolin (Ancef)     Surgeon: Mariam Kim DO     Assistants: El Isaacs    Anesthesia: Epidural anesthesia    ASA Class: 1    Procedure Details   The patient was seen in the Holding Room. The risks, benefits, complications, treatment options, and expected outcomes were discussed with the patient.  The patient concurred with the proposed plan, giving informed consent.  The site of surgery properly noted/marked. The patient was taken to the Operating Room, identified as Alicia Diaz and the procedure verified as  Delivery. A Time Out was held and the above information confirmed.    After induction of anesthesia, the patient was draped and prepped in the usual sterile manner. A Pfannenstiel incision was made and carried down through the subcutaneous tissue to the fascia. Fascial incision was made and extended transversely. The fascia was  from the underlying rectus tissue superiorly and inferiorly. The peritoneum was identified and entered. Peritoneal incision was extended longitudinally.  A low transverse uterine incision was made.  There was a delivery of a viable female, born in vertex presentation. The cord was cut and clamped and handed off to the waiting pediatrician.      The placenta was removed intact and appeared normal. The uterine outline, tubes and ovaries appeared normal. The uterine incision was closed with a double running locked sutures of 1-0 Vicryl. Hemostasis was observed. The gutters were cleared of all fluid and clots. The muscle was closed with 3-0 Chromic.  The fascia was then reapproximated with  running sutures of 0 vicryl.  The Sub-Q was closed with 3-0 Vicryl and the skin was reapproximated with 4-0 Monocryl.    Instrument, sponge, and needle counts were correct times two prior to the abdominal closure and at the conclusion of the case.         Infant:            Gender: female  infant    Weight: 4210 g (9 lb 4.5 oz)     Apgars: 7   @ 1 minute /     8   @ 5 minutes    Cord gases: Venous:       Arterial:        Fetal presentation: cephalic    Complications:   None      Disposition:   Mother to Mother Baby/Postpartum  in stable condition currently.   Baby to NICU  in stable condition currently.       Mariam Kim DO  2/5/2018  4:23 PM

## 2018-02-05 NOTE — ANESTHESIA POSTPROCEDURE EVALUATION
Patient: Alicia Diaz    Procedure Summary     Date Anesthesia Start Anesthesia Stop Room / Location    18 1230  [facility]  [facility]       Procedure Diagnosis Surgeon Provider     SECTION PRIMARY (Bilateral Abdomen) No diagnosis on file. DO Jayden Torrez,           Anesthesia Type: epidural  Last vitals  BP   101/66   Temp   97.7   Pulse   96   Resp   16     SpO2    98%     Post Anesthesia Care and Evaluation    Patient location during evaluation: bedside  Patient participation: complete - patient participated  Level of consciousness: awake and alert  Pain score: 0  Pain management: satisfactory to patient  Airway patency: patent  Anesthetic complications: No anesthetic complications  PONV Status: none  Cardiovascular status: acceptable  Respiratory status: acceptable  Hydration status: acceptable

## 2018-02-05 NOTE — ANESTHESIA PREPROCEDURE EVALUATION
Anesthesia Evaluation     Patient summary reviewed and Nursing notes reviewed   NPO Solid Status: > 6 hours  NPO Liquid Status: > 2 hours     Airway   Mallampati: II  TM distance: >3 FB  Neck ROM: full  no difficulty expected  Dental      Pulmonary    (+) asthma,   Cardiovascular - negative cardio ROS        Neuro/Psych- negative ROS  GI/Hepatic/Renal/Endo - negative ROS     Musculoskeletal (-) negative ROS    Abdominal    Substance History - negative use     OB/GYN    (+) Pregnant,         Other                                              Anesthesia Plan    ASA 2 - emergent     epidural   (Transferred to OR for emergent  secondary to fetal bradycardia.  Anesthetic plan:  Dose existing lumbar epidural.)  Anesthetic plan and risks discussed with patient.

## 2018-02-05 NOTE — PLAN OF CARE
Problem: Labor (Cervical Ripen, Induct, Augment) (Adult,Obstetrics,Pediatric)  Intervention: Prevent/Manage Maternal Infection   18 0738   Safety Interventions   Infection Management aseptic technique maintained     Intervention: Position/Reposition to Promote Fetal Well Being/Optimize Contraction Pattern   18 1415   Positioning   Positioning side lying, left     Intervention: Monitor/Manage Labor Dystocia   18 1415   Maternal/Fetal Interventions   Labor Interventions toco adjusted;ultrasound adjusted     Intervention: Assess for/Assist with Variations in Fetal Presentation   18 1415   Maternal/Fetal Interventions   Labor Interventions toco adjusted;ultrasound adjusted   Positioning   Positioning side lying, left     Intervention: Monitor/Manage Labor Pain   18 1539   Manage Acute Burn Pain   Pain Management Interventions medicated     Intervention: Provide Emotional Support and Encouragement   18 153   Coping Strategies   Trust Relationship/Rapport care explained;choices provided;questions answered;questions encouraged;thoughts/feelings acknowledged     Intervention: Monitor/Manage Maternal Hemodynamic Stability   18 1539   Cardiac Interventions    Bleed Management Rh status confirmed       Goal: Signs and Symptoms of Listed Potential Problems Will be Absent or Manageable (Labor)  Outcome: Ongoing (interventions implemented as appropriate)   18 1539   Labor (Cervical Ripen, Induct, Augment)   Problems Assessed (Labor) all   Problems Present (Labor) none

## 2018-02-06 LAB
BASOPHILS # BLD AUTO: 0.01 10*3/MM3 (ref 0–0.2)
BASOPHILS NFR BLD AUTO: 0.1 % (ref 0–1)
DEPRECATED RDW RBC AUTO: 46 FL (ref 37–54)
EOSINOPHIL # BLD AUTO: 0.01 10*3/MM3 (ref 0–0.3)
EOSINOPHIL NFR BLD AUTO: 0.1 % (ref 0–3)
ERYTHROCYTE [DISTWIDTH] IN BLOOD BY AUTOMATED COUNT: 14.2 % (ref 11.3–14.5)
HCT VFR BLD AUTO: 31.6 % (ref 34.5–44)
HGB BLD-MCNC: 10.4 G/DL (ref 11.5–15.5)
IMM GRANULOCYTES # BLD: 0.03 10*3/MM3 (ref 0–0.03)
IMM GRANULOCYTES NFR BLD: 0.3 % (ref 0–0.6)
LYMPHOCYTES # BLD AUTO: 0.96 10*3/MM3 (ref 0.6–4.8)
LYMPHOCYTES NFR BLD AUTO: 10 % (ref 24–44)
MCH RBC QN AUTO: 29.4 PG (ref 27–31)
MCHC RBC AUTO-ENTMCNC: 32.9 G/DL (ref 32–36)
MCV RBC AUTO: 89.3 FL (ref 80–99)
MONOCYTES # BLD AUTO: 0.66 10*3/MM3 (ref 0–1)
MONOCYTES NFR BLD AUTO: 6.9 % (ref 0–12)
NEUTROPHILS # BLD AUTO: 7.91 10*3/MM3 (ref 1.5–8.3)
NEUTROPHILS NFR BLD AUTO: 82.6 % (ref 41–71)
PLATELET # BLD AUTO: 117 10*3/MM3 (ref 150–450)
PMV BLD AUTO: 11.3 FL (ref 6–12)
RBC # BLD AUTO: 3.54 10*6/MM3 (ref 3.89–5.14)
WBC NRBC COR # BLD: 9.58 10*3/MM3 (ref 3.5–10.8)

## 2018-02-06 PROCEDURE — 25010000002 PNEUMOCOCCAL VAC POLYVALENT PER 0.5 ML: Performed by: OBSTETRICS & GYNECOLOGY

## 2018-02-06 PROCEDURE — 25010000002 DIPHENHYDRAMINE PER 50 MG: Performed by: OBSTETRICS & GYNECOLOGY

## 2018-02-06 PROCEDURE — 90732 PPSV23 VACC 2 YRS+ SUBQ/IM: CPT | Performed by: OBSTETRICS & GYNECOLOGY

## 2018-02-06 PROCEDURE — G0009 ADMIN PNEUMOCOCCAL VACCINE: HCPCS | Performed by: OBSTETRICS & GYNECOLOGY

## 2018-02-06 PROCEDURE — 85025 COMPLETE CBC W/AUTO DIFF WBC: CPT | Performed by: OBSTETRICS & GYNECOLOGY

## 2018-02-06 RX ADMIN — SIMETHICONE CHEW TAB 80 MG 80 MG: 80 TABLET ORAL at 16:51

## 2018-02-06 RX ADMIN — DIPHENHYDRAMINE HYDROCHLORIDE 12.5 MG: 50 INJECTION INTRAMUSCULAR; INTRAVENOUS at 01:49

## 2018-02-06 RX ADMIN — SIMETHICONE CHEW TAB 80 MG 80 MG: 80 TABLET ORAL at 11:33

## 2018-02-06 RX ADMIN — DOCUSATE SODIUM 100 MG: 100 CAPSULE, LIQUID FILLED ORAL at 07:34

## 2018-02-06 RX ADMIN — SIMETHICONE CHEW TAB 80 MG 80 MG: 80 TABLET ORAL at 20:51

## 2018-02-06 RX ADMIN — SIMETHICONE CHEW TAB 80 MG 80 MG: 80 TABLET ORAL at 07:34

## 2018-02-06 RX ADMIN — OXYCODONE AND ACETAMINOPHEN 1 TABLET: 5; 325 TABLET ORAL at 20:51

## 2018-02-06 RX ADMIN — IBUPROFEN 600 MG: 600 TABLET, FILM COATED ORAL at 13:06

## 2018-02-06 RX ADMIN — OXYCODONE AND ACETAMINOPHEN 1 TABLET: 5; 325 TABLET ORAL at 16:51

## 2018-02-06 RX ADMIN — PNEUMOCOCCAL VACCINE POLYVALENT 0.5 ML
25; 25; 25; 25; 25; 25; 25; 25; 25; 25; 25; 25; 25; 25; 25; 25; 25; 25; 25; 25; 25; 25; 25 INJECTION, SOLUTION INTRAMUSCULAR; SUBCUTANEOUS at 13:11

## 2018-02-06 RX ADMIN — OXYCODONE AND ACETAMINOPHEN 1 TABLET: 5; 325 TABLET ORAL at 13:06

## 2018-02-06 RX ADMIN — OXYCODONE AND ACETAMINOPHEN 1 TABLET: 5; 325 TABLET ORAL at 08:28

## 2018-02-06 RX ADMIN — OXYCODONE AND ACETAMINOPHEN 1 TABLET: 5; 325 TABLET ORAL at 01:44

## 2018-02-06 RX ADMIN — IBUPROFEN 600 MG: 600 TABLET, FILM COATED ORAL at 07:34

## 2018-02-06 NOTE — ANESTHESIA POSTPROCEDURE EVALUATION
Patient: Alicia Diaz    Procedure Summary     Date Anesthesia Start Anesthesia Stop Room / Location    18 1230 1558 [facility] / [facility]       Procedure Diagnosis Surgeon Provider     SECTION PRIMARY (Bilateral Abdomen) No diagnosis on file. DO Jayden Torrez,           Anesthesia Type: epidural  Last vitals  BP   93/50 (18 0400)   Temp   97.8 °F (36.6 °C) (18 0400)   Pulse   80 (18 0400)   Resp   16 (18 0400)     SpO2   95 % (18 1700)     Post Anesthesia Care and Evaluation    Patient location during evaluation: bedside  Patient participation: complete - patient participated  Level of consciousness: awake and alert  Pain management: adequate  Airway patency: patent  Anesthetic complications: No anesthetic complications    Cardiovascular status: acceptable  Respiratory status: acceptable  Hydration status: acceptable  Post Neuraxial Block status: Motor and sensory function returned to baseline and No signs or symptoms of PDPH

## 2018-02-06 NOTE — LACTATION NOTE
02/06/18 1045   Maternal Information   Person Making Referral other (see comments)  (Courtesy visit)   Maternal Infant Assessment   Size Issue, Bilateral Breasts no   Shape, Bilateral Breasts round   Density, Bilateral Breasts soft   Nipples, Bilateral everted   Nipple Conditions, Bilateral intact   Equipment Type/Education   Breast Pump Type double electric, hospital grade   Breast Pump Flange Type hard   Breast Pump Flange Size 24 mm   Breast Pumping other (see comments)  (Pump q 2-3 hours after breastfeeding R/T low supply)

## 2018-02-06 NOTE — LACTATION NOTE
"This note was copied from a baby's chart.     02/06/18 1700   Maternal Information   Date of Referral 02/06/18   Person Making Referral patient   Maternal Reason for Referral breastfeeding currently   Maternal Infant Assessment   Size Issue, Bilateral Breasts no   Shape, Bilateral Breasts round   Density, Bilateral Breasts soft   Nipples, Bilateral everted   Nipple Conditions, Bilateral intact;erythema   Infant Assessment   Sucking Reflex present   Rooting Reflex present   Swallow Reflex present   LATCH Score   Latch 2-->grasps breast, tongue down, lips flanged, rhythmic sucking   Audible Swallowing 1-->a few with stimulation   Type Of Nipple 2-->everted (after stimulation)   Comfort (Breast/Nipple) 1-->filling, red/small blisters/bruises, mild/mod discomfort   Hold (Positioning) 1-->minimal assist, teach one side: mother does other, staff holds   Score (less than 7 for 2/more consecutive times, consult Lactation Consultant) 7   Maternal Infant Feeding   Maternal Emotional State anxious   Infant Positioning clutch/\"football\"   Signs of Milk Transfer infant jaw motion present   Presence of Pain no   Comfort Measures Before/During Feeding infant position adjusted;latch adjusted   Latch Assistance yes   Current Delivery Breastfeeding History   Currently Breastfeeding yes   Feeding Infant   Feeding Readiness Cues rooting   Effective Latch During Feeding yes   Audible Swallow yes   Suck/Swallow Coordination present   Skin-to-Skin Contact During Feeding yes   Equipment Type/Education   Breast Pump Type double electric, hospital grade   Breast Pump Flange Type hard   Breast Pump Flange Size 24 mm     "

## 2018-02-06 NOTE — PROGRESS NOTES
2018     PROGRESS NOTE    POD #1    SUBJECTIVE     Doing well.  Pain controlled.  Ambulating.  Tolerating PO.    PHYSICAL EXAMINATION      Vital Signs Range for the last 24 hours  Temperature: Temp:  [97.6 °F (36.4 °C)-98.5 °F (36.9 °C)] 97.8 °F (36.6 °C)   BP: BP: ()/(50-79) 93/50   Pulse: Heart Rate:  [] 80   Respirations: Resp:  [16-18] 16     Constitutional:  No acute distress.  Abdomen:  Soft, non-distended, appropriately tender.  Fundus: firm and beneath umbilicus.  Incision:  Clean/dry/intact    LABS  Hematocrit   Date Value Ref Range Status   2018 36.1 34.5 - 44.0 % Final     Lab Results   Component Value Date    ABORH O Rh Positive 10/19/2014    RUBELLAIGGIN Immune 2014    HEPBSAG Non-Reactive 2017         Assessment  1. POD# 1 after Primary for NRFHT    Plan  1. Continue post-op care.  2. Discontinue Earl  3. Heplock IV      This note has been electronically signed.        Mariam Kim DO  2018  8:43 AM

## 2018-02-07 VITALS
SYSTOLIC BLOOD PRESSURE: 110 MMHG | BODY MASS INDEX: 26.46 KG/M2 | HEIGHT: 64 IN | HEART RATE: 95 BPM | WEIGHT: 155 LBS | RESPIRATION RATE: 16 BRPM | TEMPERATURE: 97.9 F | DIASTOLIC BLOOD PRESSURE: 59 MMHG | OXYGEN SATURATION: 95 %

## 2018-02-07 PROBLEM — Z98.891 S/P EMERGENCY CESAREAN SECTION: Status: ACTIVE | Noted: 2018-02-07

## 2018-02-07 RX ORDER — IBUPROFEN 600 MG/1
600 TABLET ORAL ONCE AS NEEDED
Qty: 30 TABLET | Refills: 0 | Status: SHIPPED | OUTPATIENT
Start: 2018-02-07 | End: 2019-06-17

## 2018-02-07 RX ORDER — PSEUDOEPHEDRINE HCL 30 MG
100 TABLET ORAL 2 TIMES DAILY PRN
Qty: 60 CAPSULE | Refills: 0 | Status: SHIPPED | OUTPATIENT
Start: 2018-02-07 | End: 2019-06-17

## 2018-02-07 RX ORDER — OXYCODONE HYDROCHLORIDE AND ACETAMINOPHEN 5; 325 MG/1; MG/1
1-2 TABLET ORAL EVERY 4 HOURS PRN
Qty: 40 TABLET | Refills: 0 | Status: SHIPPED | OUTPATIENT
Start: 2018-02-07 | End: 2018-02-15

## 2018-02-07 RX ADMIN — SIMETHICONE CHEW TAB 80 MG 80 MG: 80 TABLET ORAL at 08:18

## 2018-02-07 RX ADMIN — OXYCODONE AND ACETAMINOPHEN 1 TABLET: 5; 325 TABLET ORAL at 05:48

## 2018-02-07 RX ADMIN — OXYCODONE AND ACETAMINOPHEN 1 TABLET: 5; 325 TABLET ORAL at 01:12

## 2018-02-07 RX ADMIN — IBUPROFEN 600 MG: 600 TABLET, FILM COATED ORAL at 08:18

## 2018-02-07 RX ADMIN — DOCUSATE SODIUM 100 MG: 100 CAPSULE, LIQUID FILLED ORAL at 08:18

## 2018-02-07 RX ADMIN — OXYCODONE AND ACETAMINOPHEN 1 TABLET: 5; 325 TABLET ORAL at 10:28

## 2018-02-07 RX ADMIN — IBUPROFEN 600 MG: 600 TABLET, FILM COATED ORAL at 01:12

## 2018-02-07 NOTE — PLAN OF CARE
Problem: Patient Care Overview (Adult)  Goal: Plan of Care Review  Outcome: Ongoing (interventions implemented as appropriate)   18 0429   Patient Care Overview   Progress improving   Outcome Evaluation   Outcome Summary/Follow up Plan VSS. Pain controlled by meds. Breastfeeding well. Would like an early D/C.   Coping/Psychosocial Response Interventions   Plan Of Care Reviewed With patient     Goal: Adult Individualization and Mutuality  Outcome: Ongoing (interventions implemented as appropriate)    Goal: Discharge Needs Assessment  Outcome: Ongoing (interventions implemented as appropriate)      Problem: Postpartum ( Delivery) (Adult)  Goal: Signs and Symptoms of Listed Potential Problems Will be Absent or Manageable (Postpartum)  Outcome: Ongoing (interventions implemented as appropriate)      Problem: Breastfeeding (Adult,NICU,[place],Obstetrics,Pediatric)  Goal: Signs and Symptoms of Listed Potential Problems Will be Absent or Manageable (Breastfeeding)  Outcome: Ongoing (interventions implemented as appropriate)

## 2018-02-07 NOTE — PROGRESS NOTES
2018    Name:Alicia Diaz    MR#:[id number]     PROGRESS NOTE:  Post-Op 2 S/P        Subjective   37 y.o. yo Female  s/p CS at 40w0d doing well. Pain well controlled, lochia appropriate, tolerating diet.     Patient Active Problem List   Diagnosis   (none) - all problems resolved or deleted        Objective    Vitals  Temp:  Temp:  [97.8 °F (36.6 °C)-98.3 °F (36.8 °C)] 97.9 °F (36.6 °C)  Temp src: Oral  BP:  BP: (101-110)/(57-67) 110/59  Pulse:  Heart Rate:  [] 95  RR:   Resp:  [16-20] 16    General Awake, alert, no distress  Abdomen Soft, non-distended, fundus firm, below umbilicus, appropriately tender  Incision  Intact, no erythema or exudate  Extremities Calves NT bilaterally     I/O last 3 completed shifts:  In: -   Out: 4150 [Urine:4150]    LABS:   Lab Results   Component Value Date    WBC 9.58 2018    HGB 10.4 (L) 2018    HCT 31.6 (L) 2018    MCV 89.3 2018     (L) 2018       Infant: female       Assessment   1.  POD 2 from  Section    Plan: Doing well.    Desires d/c home.  Precautions reviewed.  RTC 2w for incision check          Evelia Carrero MD  2018 12:26 PM

## 2018-02-07 NOTE — DISCHARGE SUMMARY
[facility]   Discharge Summary      Patient: Alicia Diaz      MR#:[id number]  Admission  Diagnosis:   Problem List Items Addressed This Visit     RESOLVED: Postmaturity pregnancy, 40-42 weeks gestation - Primary    Relevant Medications    methylergonovine (METHERGINE) injection 200 mcg    carboprost (HEMABATE) injection 250 mcg    misoprostol (CYTOTEC) tablet 800 mcg          Discharge Diagnosis:   1. Postmaturity pregnancy, 40-42 weeks gestation    s/p emergent PCS for NRFHT    Date of Admission: 2018  Date of Discharge:  2018    Procedures:  , Low Transverse     2018    2:59 PM      Service:  Obstetrics    Hospital Course:  Patient underwent  section and remained in the hospital for 2 days.  During that time she remained afebrile and hemodynamically stable.  On the day of discharge, she was eating, ambulating and voiding without difficulty.      Labs  Lab Results   Component Value Date    WBC 9.58 2018    HGB 10.4 (L) 2018    HCT 31.6 (L) 2018    MCV 89.3 2018     (L) 2018       Results from last 7 days  Lab Units 18  0657   ABO TYPING  O   RH TYPING  Positive   ANTIBODY SCREEN  Negative       Discharge Medications   Alicia Diaz   Home Medication Instructions EARNESTINE:[id number]    Printed on:18 1227   Medication Information                      albuterol (PROVENTIL) (2.5 MG/3ML) 0.083% nebulizer solution  Take 2.5 mg by nebulization Every 4 (Four) Hours As Needed for Wheezing.             docusate sodium 100 MG capsule  Take 100 mg by mouth 2 (Two) Times a Day As Needed for Constipation.             ibuprofen (ADVIL,MOTRIN) 600 MG tablet  Take 1 tablet by mouth 1 (One) Time As Needed for Mild Pain  for up to 1 dose.             mometasone-formoterol (DULERA 100) 100-5 MCG/ACT inhaler  Inhale 2 puffs 2 (Two) Times a Day.             montelukast (SINGULAIR) 10 MG tablet  Take 10 mg by mouth Every Night.              oxyCODONE-acetaminophen (PERCOCET) 5-325 MG per tablet  Take 1-2 tablets by mouth Every 4 (Four) Hours As Needed for Severe Pain  for up to 8 days.             Prenatal Vit-Fe Fumarate-FA (PRENATAL 27-1) 27-1 MG tablet tablet  Take 1 tablet by mouth Daily.                 Discharge Disposition:  To Home    Discharge Condition:  Stable    Discharge Diet: regular    Activity at Discharge: pelvic rest    Follow-up Appointments  2 weeks    Evelia Carrero MD  02/07/18  12:27 PM

## 2018-02-16 ENCOUNTER — HOSPITAL ENCOUNTER (OUTPATIENT)
Dept: LACTATION | Facility: HOSPITAL | Age: 38
Discharge: HOME | End: 2018-02-16

## 2018-02-16 NOTE — LACTATION NOTE
02/16/18 1400   Maternal Information   Date of Referral 02/16/18   Person Making Referral patient   Maternal Reason for Referral breastfeeding currently   Infant Reason for Referral other (see comments)  (Pre-Post weight check.)   Maternal Information   Language Assistance Needed no   Maternal  Assessment   Size Issue, Bilateral Breasts no   Shape, Bilateral Breasts round   Density, Bilateral Breasts full   Nipples, Bilateral everted   Nipple Conditions, Bilateral intact   Additional Documentation (Latch) LATCH Score (Group)   Infant Assessment   Weight Loss (%) (Weighed 9-4 at birth weighs 9-1 today at 11 days.)   Mouth Size average   Tongue Symptoms intact;moist;pink   Frenulum normal   Gum Symptoms intact;moist;pink   Sucking Reflex present   Rooting Reflex present   Swallow Reflex present   Skin Color pink   Tone (Musculoskeletal) appropriate for gestational age   Number of Stools (24 hours) 3   Number of Voids (24 hours) 6   LATCH Score   Latch 2-->grasps breast, tongue down, lips flanged, rhythmic sucking   Audible Swallowing 2-->spontaneous and intermittent (24 hrs old)   Type Of Nipple 2-->everted (after stimulation)   Comfort (Breast/Nipple) 2-->soft/nontender   Hold (Positioning) 2-->no assist from staff, mother able to position/hold infant   Score (less than 7 for 2/more consecutive times, consult Lactation Consultant) 10   Maternal Infant Feeding   Maternal Emotional State independent;relaxed   Previous Breastfeeding History yes   Infant Positioning cross-cradle   Signs of Milk Transfer audible swallow;breasts soften with feeding   Presence of Pain no   Nipple Shape After Feeding, Left Breast symmetrical   Nipple Shape After Feeding, Right Breast symmetrical   Latch Assistance no   Current Delivery Breastfeeding History   Current Breastfeeding History yes   Current Breastfeeding Success successful   Duration of Current Breastfeeding 11 days   Currently Breastfeeding yes   Current Breastfeeding  Problems (None)   Feeding Infant   Feeding Readiness Cues rooting   Satiety Cues infant releases breast   Disengagement Cues disinterested   Effective Latch During Feeding yes   Audible Swallow yes   Suck/Swallow Coordination present   Skin-to-Skin Contact During Feeding yes   Pre-Feeding Weight (gm),  4110 gm   Post-Feeding Weight (gm),  4185 gm   Weight Gain/Loss (gm),  75 gm

## 2019-01-10 ENCOUNTER — TELEPHONE (OUTPATIENT)
Dept: INTERNAL MEDICINE | Facility: CLINIC | Age: 39
End: 2019-01-10

## 2019-01-10 NOTE — TELEPHONE ENCOUNTER
PT CALLED IN STATES THAT SHE HAS NOT BEEN SLEEPING VERY WELL AND HAS A SOME QUESTIONS.    PT REPORTS THAT IT HAS BEEN GOING ON FOR ABOUT A WEEK.     STARTED WITH WAKING UP IN MIDDLE OF NIGHT AND HARD TIME FALLING BACK ASLEEP AND NOW HAS PROGRESSED TO HAVING TROUBLE FALLING ASLEEP.    CALLBACK   [phone]

## 2019-01-11 ENCOUNTER — OFFICE VISIT (OUTPATIENT)
Dept: INTERNAL MEDICINE | Facility: CLINIC | Age: 39
End: 2019-01-11

## 2019-01-11 VITALS
SYSTOLIC BLOOD PRESSURE: 92 MMHG | DIASTOLIC BLOOD PRESSURE: 60 MMHG | BODY MASS INDEX: 21 KG/M2 | TEMPERATURE: 98.7 F | WEIGHT: 123 LBS | HEART RATE: 72 BPM | HEIGHT: 64 IN

## 2019-01-11 DIAGNOSIS — G47.00 INSOMNIA, UNSPECIFIED TYPE: ICD-10-CM

## 2019-01-11 DIAGNOSIS — F41.9 ANXIETY: Primary | ICD-10-CM

## 2019-01-11 PROCEDURE — 99213 OFFICE O/P EST LOW 20 MIN: CPT | Performed by: PHYSICIAN ASSISTANT

## 2019-01-11 RX ORDER — ESCITALOPRAM OXALATE 10 MG/1
TABLET ORAL
COMMUNITY
Start: 2019-01-07 | End: 2019-04-23 | Stop reason: SDUPTHER

## 2019-01-11 RX ORDER — ALPRAZOLAM 0.5 MG/1
1 TABLET ORAL 2 TIMES DAILY PRN
COMMUNITY
Start: 2018-11-26 | End: 2019-07-15 | Stop reason: SDUPTHER

## 2019-01-11 RX ORDER — LORATADINE 10 MG/1
1 TABLET ORAL DAILY
COMMUNITY
Start: 2014-12-22

## 2019-01-11 NOTE — PROGRESS NOTES
Patient Care Team:  Curry Gill MD as PCP - General (Internal Medicine)    Chief Complaint;:   Chief Complaint   Patient presents with   • Insomnia     symptoms for about a week   • Anxiety        Subjective     HPI    Past Medical History:   Diagnosis Date   • Asthma    • Asthma    • Family history of asthma    • Irritable bowel syndrome        Past Surgical History:   Procedure Laterality Date   •  SECTION Bilateral 2018    Procedure:  SECTION PRIMARY;  Surgeon: Mariam Kim DO;  Location: [facility] LABOR DELIVERY;  Service:    • WISDOM TOOTH EXTRACTION         Family History   Problem Relation Age of Onset   • Diabetes Paternal Grandmother        Social History     Socioeconomic History   • Marital status:      Spouse name: Not on file   • Number of children: Not on file   • Years of education: Not on file   • Highest education level: Not on file   Social Needs   • Financial resource strain: Not on file   • Food insecurity - worry: Not on file   • Food insecurity - inability: Not on file   • Transportation needs - medical: Not on file   • Transportation needs - non-medical: Not on file   Occupational History   • Not on file   Tobacco Use   • Smoking status: Never Smoker   • Smokeless tobacco: Never Used   Substance and Sexual Activity   • Alcohol use: No   • Drug use: No   • Sexual activity: Yes     Partners: Male   Other Topics Concern   • Not on file   Social History Narrative   • Not on file       Allergies   Allergen Reactions   • Bactrim [Sulfamethoxazole-Trimethoprim] GI Intolerance       Review of Systems:     Review of Systems   Constitutional: Negative for chills, fatigue and fever.   HENT: Negative for congestion, ear pain and sinus pressure.    Respiratory: Negative for cough, chest tightness, shortness of breath and wheezing.    Cardiovascular: Negative for chest pain and palpitations.   Gastrointestinal: Negative for abdominal pain, blood in stool and  "constipation.   Skin: Negative for color change.   Allergic/Immunologic: Negative for environmental allergies.   Neurological: Positive for headache. Negative for dizziness and speech difficulty.   Psychiatric/Behavioral: Negative for decreased concentration. The patient is nervous/anxious.        Vital Signs  Vitals:    01/11/19 0938   BP: 92/60   BP Location: Left arm   Patient Position: Sitting   Cuff Size: Adult   Pulse: 72   Temp: 98.7 °F (37.1 °C)   TempSrc: Temporal   Weight: 55.8 kg (123 lb)   Height: 162.6 cm (64.02\")   PainSc: 0-No pain         Current Outpatient Medications:   •  albuterol (PROVENTIL) (2.5 MG/3ML) 0.083% nebulizer solution, Take 2.5 mg by nebulization Every 4 (Four) Hours As Needed for Wheezing., Disp: , Rfl:   •  ALPRAZolam (XANAX) 0.5 MG tablet, Take 1 tablet by mouth Every 12 (Twelve) Hours., Disp: , Rfl:   •  escitalopram (LEXAPRO) 10 MG tablet, Take one daily, Disp: , Rfl:   •  fluticasone-salmeterol (ADVAIR DISKUS) 500-50 MCG/DOSE DISKUS, Inhale., Disp: , Rfl:   •  loratadine (CLARITIN) 10 MG tablet, Take 1 tablet by mouth Daily., Disp: , Rfl:   •  Prenatal Vit-Fe Fumarate-FA (PRENATAL 27-1) 27-1 MG tablet tablet, Take 1 tablet by mouth Daily., Disp: , Rfl:   •  docusate sodium 100 MG capsule, Take 100 mg by mouth 2 (Two) Times a Day As Needed for Constipation., Disp: 60 capsule, Rfl: 0  •  ibuprofen (ADVIL,MOTRIN) 600 MG tablet, Take 1 tablet by mouth 1 (One) Time As Needed for Mild Pain  for up to 1 dose., Disp: 30 tablet, Rfl: 0  •  mometasone-formoterol (DULERA 100) 100-5 MCG/ACT inhaler, Inhale 2 puffs 2 (Two) Times a Day., Disp: , Rfl:   •  montelukast (SINGULAIR) 10 MG tablet, Take 10 mg by mouth Every Night., Disp: , Rfl:     Physical Exam:    Physical Exam     Results Review:    I reviewed the patient's new clinical results.    Assessment/Plan   There are no diagnoses linked to this encounter.  There are no Patient Instructions on file for this visit.    Plan of care " reviewed with patient at the conclusion of today's visit. Education was provided regarding diagnosis, management, and any prescribed or recommended OTC medications.Patient verbalizes understanding of and agreement with management plan.     Jeniffer Daniels MA

## 2019-01-11 NOTE — PATIENT INSTRUCTIONS
Continue Lexapro, call in 2-3 weeks if symptoms have not improved.  Recommend she try Rem Fresh for sleep.   If not effective she will call and will try trazodone.

## 2019-01-11 NOTE — PROGRESS NOTES
Patient Care Team:  Curry Gill MD as PCP - General (Internal Medicine)    Chief Complaint;:   Chief Complaint   Patient presents with   • Insomnia     symptoms for about a week   • Anxiety        Subjective     HPI  Sleep disturbance for the last 1-2 weeks.   She has tried Benadryl, melatonin without much success.  She is no longer breast feeding.   She thinks the Holidays made things worse.  Depression/anxiety has also increased, she restarted Lexapro few days ago.  Past Medical History:   Diagnosis Date   • Asthma    • Asthma    • Family history of asthma    • Irritable bowel syndrome        Social History     Socioeconomic History   • Marital status:      Spouse name: Not on file   • Number of children: Not on file   • Years of education: Not on file   • Highest education level: Not on file   Social Needs   • Financial resource strain: Not on file   • Food insecurity - worry: Not on file   • Food insecurity - inability: Not on file   • Transportation needs - medical: Not on file   • Transportation needs - non-medical: Not on file   Occupational History   • Not on file   Tobacco Use   • Smoking status: Never Smoker   • Smokeless tobacco: Never Used   Substance and Sexual Activity   • Alcohol use: No   • Drug use: No   • Sexual activity: Yes     Partners: Male   Other Topics Concern   • Not on file   Social History Narrative   • Not on file       Allergies   Allergen Reactions   • Bactrim [Sulfamethoxazole-Trimethoprim] GI Intolerance       Review of Systems:     Review of Systems   Constitutional: Negative.    Respiratory: Negative.    Cardiovascular: Negative.    Psychiatric/Behavioral: Positive for sleep disturbance, depressed mood and stress. Negative for suicidal ideas. The patient is nervous/anxious.        Vital Signs  Vitals:    01/11/19 0938   BP: 92/60   BP Location: Left arm   Patient Position: Sitting   Cuff Size: Adult   Pulse: 72   Temp: 98.7 °F (37.1 °C)   TempSrc: Temporal  "  Weight: 55.8 kg (123 lb)   Height: 162.6 cm (64.02\")   PainSc: 0-No pain         Current Outpatient Medications:   •  albuterol (PROVENTIL) (2.5 MG/3ML) 0.083% nebulizer solution, Take 2.5 mg by nebulization Every 4 (Four) Hours As Needed for Wheezing., Disp: , Rfl:   •  ALPRAZolam (XANAX) 0.5 MG tablet, Take 1 tablet by mouth Every 12 (Twelve) Hours., Disp: , Rfl:   •  escitalopram (LEXAPRO) 10 MG tablet, Take one daily, Disp: , Rfl:   •  fluticasone-salmeterol (ADVAIR DISKUS) 500-50 MCG/DOSE DISKUS, Inhale., Disp: , Rfl:   •  loratadine (CLARITIN) 10 MG tablet, Take 1 tablet by mouth Daily., Disp: , Rfl:   •  Prenatal Vit-Fe Fumarate-FA (PRENATAL 27-1) 27-1 MG tablet tablet, Take 1 tablet by mouth Daily., Disp: , Rfl:   •  docusate sodium 100 MG capsule, Take 100 mg by mouth 2 (Two) Times a Day As Needed for Constipation., Disp: 60 capsule, Rfl: 0  •  ibuprofen (ADVIL,MOTRIN) 600 MG tablet, Take 1 tablet by mouth 1 (One) Time As Needed for Mild Pain  for up to 1 dose., Disp: 30 tablet, Rfl: 0  •  mometasone-formoterol (DULERA 100) 100-5 MCG/ACT inhaler, Inhale 2 puffs 2 (Two) Times a Day., Disp: , Rfl:   •  montelukast (SINGULAIR) 10 MG tablet, Take 10 mg by mouth Every Night., Disp: , Rfl:     Physical Exam:    Physical Exam   Constitutional: She is oriented to person, place, and time. She appears well-developed and well-nourished.   HENT:   Head: Normocephalic and atraumatic.   Neck: Normal range of motion. Neck supple.   Cardiovascular: Normal rate, regular rhythm and normal heart sounds.   Pulmonary/Chest: Effort normal and breath sounds normal.   Neurological: She is alert and oriented to person, place, and time.   Psychiatric: She has a normal mood and affect. Her behavior is normal. Judgment and thought content normal.   Nursing note and vitals reviewed.        Assessment/Plan   Alicia was seen today for insomnia and anxiety.    Diagnoses and all orders for this visit:    Anxiety    Insomnia, unspecified " type      Patient Instructions   Continue Lexapro, call in 2-3 weeks if symptoms have not improved.  Recommend she try Rem Fresh for sleep.   If not effective she will call and will try trazodone.      Plan of care reviewed with patient at the conclusion of today's visit. Education was provided regarding diagnosis, management, and any prescribed or recommended OTC medications.Patient verbalizes understanding of and agreement with management plan.     Wendy Mcclelland PA-C

## 2019-04-23 ENCOUNTER — TELEPHONE (OUTPATIENT)
Dept: INTERNAL MEDICINE | Facility: CLINIC | Age: 39
End: 2019-04-23

## 2019-04-23 RX ORDER — ESCITALOPRAM OXALATE 10 MG/1
10 TABLET ORAL DAILY
Qty: 30 TABLET | Refills: 2 | Status: SHIPPED | OUTPATIENT
Start: 2019-04-23 | End: 2020-08-10

## 2019-06-17 ENCOUNTER — OFFICE VISIT (OUTPATIENT)
Dept: INTERNAL MEDICINE | Facility: CLINIC | Age: 39
End: 2019-06-17

## 2019-06-17 VITALS
BODY MASS INDEX: 21.17 KG/M2 | WEIGHT: 124 LBS | TEMPERATURE: 98.4 F | DIASTOLIC BLOOD PRESSURE: 72 MMHG | HEIGHT: 64 IN | HEART RATE: 60 BPM | SYSTOLIC BLOOD PRESSURE: 98 MMHG

## 2019-06-17 DIAGNOSIS — J45.40 MODERATE PERSISTENT ASTHMATIC BRONCHITIS WITHOUT COMPLICATION: Primary | ICD-10-CM

## 2019-06-17 DIAGNOSIS — J45.40 MODERATE PERSISTENT ASTHMA, UNSPECIFIED WHETHER COMPLICATED: Primary | ICD-10-CM

## 2019-06-17 PROCEDURE — 96372 THER/PROPH/DIAG INJ SC/IM: CPT | Performed by: INTERNAL MEDICINE

## 2019-06-17 PROCEDURE — 99213 OFFICE O/P EST LOW 20 MIN: CPT | Performed by: INTERNAL MEDICINE

## 2019-06-17 RX ORDER — AZITHROMYCIN 250 MG/1
TABLET, FILM COATED ORAL TAKE AS DIRECTED
Refills: 0 | COMMUNITY
Start: 2019-06-14 | End: 2019-06-19

## 2019-06-17 RX ORDER — METHYLPREDNISOLONE 4 MG/1
TABLET ORAL
Qty: 21 EACH | Refills: 0 | Status: SHIPPED | OUTPATIENT
Start: 2019-06-17 | End: 2019-06-20

## 2019-06-17 RX ORDER — TRIAMCINOLONE ACETONIDE 40 MG/ML
80 INJECTION, SUSPENSION INTRA-ARTICULAR; INTRAMUSCULAR ONCE
Status: COMPLETED | OUTPATIENT
Start: 2019-06-17 | End: 2019-06-17

## 2019-06-17 RX ADMIN — TRIAMCINOLONE ACETONIDE 80 MG: 40 INJECTION, SUSPENSION INTRA-ARTICULAR; INTRAMUSCULAR at 10:26

## 2019-06-17 NOTE — PROGRESS NOTES
Patient Care Team:  Curry Gill MD as PCP - General (Internal Medicine)    Chief Complaint::   Chief Complaint   Patient presents with   • Cough     deep hacking cough         Subjective     HPI   Pt has been on a Z ned for two days.  She uses Advair 500/50 BID.  She has been using alubuterol as needed.      Cough   This is a new problem. The current episode started in the past 7 days. The problem has been unchanged. The cough is productive of sputum. Associated symptoms include shortness of breath and wheezing. Pertinent negatives include no chest pain, chills, ear pain or fever. Nothing aggravates the symptoms. Her past medical history is significant for environmental allergies.       PMH  The following portions of the patient's history were reviewed and updated as appropriate: allergies, current medications, past family history, past medical history, past social history, past surgical history and problem list.    Review of Systems:   Review of Systems   Constitutional: Negative for activity change, appetite change, chills, fatigue and fever.   HENT: Negative for congestion, ear pain and sinus pressure.    Eyes: Negative for blurred vision and double vision.   Respiratory: Positive for cough, chest tightness, shortness of breath and wheezing.    Cardiovascular: Negative for chest pain and palpitations.   Gastrointestinal: Negative for abdominal pain, blood in stool and constipation.   Endocrine: Negative for cold intolerance and heat intolerance.   Skin: Negative for color change.   Allergic/Immunologic: Positive for environmental allergies.   Neurological: Negative for dizziness, speech difficulty and headache.   Psychiatric/Behavioral: Negative for decreased concentration. The patient is nervous/anxious.        Vital Signs  Vitals:    06/17/19 0958   BP: 98/72   BP Location: Left arm   Patient Position: Sitting   Cuff Size: Adult   Pulse: 60   Temp: 98.4 °F (36.9 °C)   TempSrc: Temporal   Weight: 56.2  "kg (124 lb)   Height: 162.6 cm (64.02\")   PainSc:   4   PainLoc: Chest       Labs  No visits with results within 3 Month(s) from this visit.   Latest known visit with results is:   Admission on 02/05/2018, Discharged on 02/07/2018   Component Date Value Ref Range Status   • External Strep Group B Ag 01/15/2018 Negative   Final   • WBC 02/05/2018 6.68  3.50 - 10.80 10*3/mm3 Final   • RBC 02/05/2018 4.08  3.89 - 5.14 10*6/mm3 Final   • Hemoglobin 02/05/2018 12.2  11.5 - 15.5 g/dL Final   • Hematocrit 02/05/2018 36.1  34.5 - 44.0 % Final   • MCV 02/05/2018 88.5  80.0 - 99.0 fL Final   • MCH 02/05/2018 29.9  27.0 - 31.0 pg Final   • MCHC 02/05/2018 33.8  32.0 - 36.0 g/dL Final   • RDW 02/05/2018 13.7  11.3 - 14.5 % Final   • RDW-SD 02/05/2018 44.7  37.0 - 54.0 fl Final   • MPV 02/05/2018 11.5  6.0 - 12.0 fL Final   • Platelets 02/05/2018 122* 150 - 450 10*3/mm3 Final   • ABO Type 02/05/2018 O   Final   • RH type 02/05/2018 Positive   Final   • Antibody Screen 02/05/2018 Negative   Final   • Site 02/05/2018 Cord Venous   Final   • pH, Cord Venous 02/05/2018 7.259  7.190 - 7.460 pH Units Final   • pCO2, Cord Venous 02/05/2018 48.0  30.0 - 60.0 mm Hg Final   • pO2, Cord Venous 02/05/2018 20.7  mm Hg Final   • HCO3, Cord Venous 02/05/2018 21.5* 18.6 - 21.4 mmol/L Final   • Base Excess, Cord Venous 02/05/2018 -6.0* 0.0 - 2.0 mmol/L Final   • O2 Sat, Cord Venous 02/05/2018 38.7  % Final   • Hemoglobin, Blood Gas 02/05/2018 16.9  14 - 18 g/dL Final   • CO2 Content 02/05/2018 22.9  22 - 33 Final   • Barometric Pressure for Blood Gas 02/05/2018   mmHg Final    N/A   • Modality 02/05/2018 N/A   Final   • FIO2 02/05/2018 21  % Final   • O2 Saturation Calculated 02/05/2018   45.0 - 75.0 % Final    Calculated O2 saturation result not reported at this site.   • pH, Cord Arterial 02/05/2018 7.14* 7.20 - 7.30 pH Units Final   • pCO2, Cord Arterial 02/05/2018 71.0* 43.3 - 54.9 mmHg Final   • pO2, Cord Arterial 02/05/2018 11.4* 11.5 - " 43.3 mmHg Final   • HCO3, Cord Arterial 02/05/2018 24.4* 16.9 - 20.5 mmol/L Final   • Base Exc, Cord Arterial 02/05/2018 -6.6* 0.0 - 2.0 mmol/L Final   • O2 Sat, Cord Arterial 02/05/2018 11.2  % Final   • Hemoglobin, Blood Gas 02/05/2018 16.9  14 - 18 g/dL Final   • CO2 Content 02/05/2018 26.5  22 - 33 Final   • Barometric Pressure for Blood Gas 02/05/2018   mmHg Final    N/A   • Modality 02/05/2018 N/A   Final   • FIO2 02/05/2018 21  % Final   • WBC 02/06/2018 9.58  3.50 - 10.80 10*3/mm3 Final   • RBC 02/06/2018 3.54* 3.89 - 5.14 10*6/mm3 Final   • Hemoglobin 02/06/2018 10.4* 11.5 - 15.5 g/dL Final   • Hematocrit 02/06/2018 31.6* 34.5 - 44.0 % Final   • MCV 02/06/2018 89.3  80.0 - 99.0 fL Final   • MCH 02/06/2018 29.4  27.0 - 31.0 pg Final   • MCHC 02/06/2018 32.9  32.0 - 36.0 g/dL Final   • RDW 02/06/2018 14.2  11.3 - 14.5 % Final   • RDW-SD 02/06/2018 46.0  37.0 - 54.0 fl Final   • MPV 02/06/2018 11.3  6.0 - 12.0 fL Final   • Platelets 02/06/2018 117* 150 - 450 10*3/mm3 Final   • Neutrophil % 02/06/2018 82.6* 41.0 - 71.0 % Final   • Lymphocyte % 02/06/2018 10.0* 24.0 - 44.0 % Final   • Monocyte % 02/06/2018 6.9  0.0 - 12.0 % Final   • Eosinophil % 02/06/2018 0.1  0.0 - 3.0 % Final   • Basophil % 02/06/2018 0.1  0.0 - 1.0 % Final   • Immature Grans % 02/06/2018 0.3  0.0 - 0.6 % Final   • Neutrophils, Absolute 02/06/2018 7.91  1.50 - 8.30 10*3/mm3 Final   • Lymphocytes, Absolute 02/06/2018 0.96  0.60 - 4.80 10*3/mm3 Final   • Monocytes, Absolute 02/06/2018 0.66  0.00 - 1.00 10*3/mm3 Final   • Eosinophils, Absolute 02/06/2018 0.01  0.00 - 0.30 10*3/mm3 Final   • Basophils, Absolute 02/06/2018 0.01  0.00 - 0.20 10*3/mm3 Final   • Immature Grans, Absolute 02/06/2018 0.03  0.00 - 0.03 10*3/mm3 Final       Imaging  All imaging past 24 hours:   Imaging Results (last 24 hours)     ** No results found for the last 24 hours. **            Current Outpatient Medications:   •  ADVAIR DISKUS 500-50 MCG/DOSE DISKUS, INHALE 1  PUFF BY MOUTH EVERY 12 HOURS, Disp: 1 each, Rfl: 5  •  albuterol (PROVENTIL) (2.5 MG/3ML) 0.083% nebulizer solution, Take 2.5 mg by nebulization Every 4 (Four) Hours As Needed for Wheezing., Disp: , Rfl:   •  ALPRAZolam (XANAX) 0.5 MG tablet, Take 1 tablet by mouth 2 (Two) Times a Day As Needed., Disp: , Rfl:   •  azithromycin (ZITHROMAX) 250 MG tablet, Take As Directed., Disp: , Rfl: 0  •  escitalopram (LEXAPRO) 10 MG tablet, Take 1 tablet by mouth Daily. Take one daily, Disp: 30 tablet, Rfl: 2  •  loratadine (CLARITIN) 10 MG tablet, Take 1 tablet by mouth Daily., Disp: , Rfl:   •  methylPREDNISolone (MEDROL, MENDOZA,) 4 MG tablet, Take as directed on package instructions., Disp: 21 each, Rfl: 0  No current facility-administered medications for this visit.     Physical Exam:    Physical Exam   Constitutional: She is oriented to person, place, and time. She appears well-developed and well-nourished. No distress.   HENT:   Head: Normocephalic and atraumatic.   Right Ear: External ear normal.   Nose: Nose normal.   Mouth/Throat: Oropharynx is clear and moist.   Eyes: Conjunctivae and EOM are normal. Pupils are equal, round, and reactive to light.   Neck: Normal range of motion. Neck supple. No JVD present.   Cardiovascular: Normal rate, regular rhythm and normal heart sounds.   No murmur heard.  Pulmonary/Chest: Effort normal. No stridor. No respiratory distress. She has wheezes.   Abdominal: Soft. Bowel sounds are normal.   Musculoskeletal: She exhibits no edema or tenderness.   Lymphadenopathy:     She has no cervical adenopathy.   Neurological: She is alert and oriented to person, place, and time.   Skin: Skin is warm and dry. She is not diaphoretic.   Psychiatric: She has a normal mood and affect. Her behavior is normal. Judgment and thought content normal.   Nursing note and vitals reviewed.      Procedures        Assessment/Plan   Problem List Items Addressed This Visit        Respiratory    Asthma - Primary     Relevant Medications    albuterol (PROVENTIL) (2.5 MG/3ML) 0.083% nebulizer solution    ADVAIR DISKUS 500-50 MCG/DOSE DISKUS    azithromycin (ZITHROMAX) 250 MG tablet    triamcinolone acetonide (KENALOG-40) injection 80 mg (Completed) (Start on 6/17/2019 11:00 AM)    methylPREDNISolone (MEDROL, MENDOZA,) 4 MG tablet          Return if symptoms worsen or fail to improve.    Plan of care reviewed with patient at the conclusion of today's visit. Education was provided regarding diagnosis, management, and any prescribed or recommended OTC medications.Patient verbalizes understanding of and agreement with management plan.     Silvia Pryor PA-C

## 2019-06-18 ENCOUNTER — TELEPHONE (OUTPATIENT)
Dept: INTERNAL MEDICINE | Facility: CLINIC | Age: 39
End: 2019-06-18

## 2019-06-18 NOTE — TELEPHONE ENCOUNTER
"What does she mean by \"not better?\"  Is she having a hard time breathing?  Does she feel more congested?  Did she not find any relief with the Tussionex at night?    She may need one more day on steroids to see if there will be improvement.  If she is having more congestion, recommend changing her antibiotic.  "

## 2019-06-18 NOTE — TELEPHONE ENCOUNTER
Pt called stating she saw Silvia yesterday and was given a steroid dose ned , steroid shot, and cough medication and was told to call back tomorrow if she isn't any better but pt states she isn't much better today after all the steroids and she doesn't think she will be tomorrow     Called pt back to get her symptoms lvm to return call

## 2019-06-18 NOTE — TELEPHONE ENCOUNTER
Pt states she is still having a hard time breathing. She states the congestion is the same as when seen in office. She also stated the tussionex helped with her cough to where she didn't have to get out of bed but she stated she was unable to sleep and became really itchy. She even took a benadryl to see if that would make her sleepy but it did not work.

## 2019-06-19 DIAGNOSIS — J40 BRONCHITIS: Primary | ICD-10-CM

## 2019-06-19 PROBLEM — J30.9 ALLERGIC RHINITIS: Status: ACTIVE | Noted: 2019-06-19

## 2019-06-19 PROBLEM — K58.9 IRRITABLE BOWEL SYNDROME: Status: ACTIVE | Noted: 2019-06-19

## 2019-06-19 PROBLEM — F32.A DEPRESSION: Status: ACTIVE | Noted: 2019-06-19

## 2019-06-19 PROBLEM — J01.90 ACUTE BACTERIAL RHINOSINUSITIS: Status: ACTIVE | Noted: 2019-06-19

## 2019-06-19 PROBLEM — B96.89 ACUTE BACTERIAL RHINOSINUSITIS: Status: ACTIVE | Noted: 2019-06-19

## 2019-06-19 RX ORDER — DOXYCYCLINE 100 MG/1
100 TABLET ORAL 2 TIMES DAILY
Qty: 14 TABLET | Refills: 0 | Status: SHIPPED | OUTPATIENT
Start: 2019-06-19 | End: 2020-02-05

## 2019-06-19 NOTE — TELEPHONE ENCOUNTER
Pt notified and verbalized understanding. Wants to know if she should still take the prednisone? Appt made for tomorrow.

## 2019-06-19 NOTE — TELEPHONE ENCOUNTER
Likely the steroid pack is making it difficult to sleep.    Stop the Tussionex.  She is itchy due to the hydrocodone.   Stop Z pack, change to Doxycycline.  This prescription will be sent to pharmacy.  Continue to use inhalers as directed.   Can she come in tomorrow to be rechecked?

## 2019-06-20 ENCOUNTER — OFFICE VISIT (OUTPATIENT)
Dept: INTERNAL MEDICINE | Facility: CLINIC | Age: 39
End: 2019-06-20

## 2019-06-20 VITALS
HEART RATE: 60 BPM | TEMPERATURE: 97.6 F | BODY MASS INDEX: 21.17 KG/M2 | HEIGHT: 64 IN | SYSTOLIC BLOOD PRESSURE: 116 MMHG | DIASTOLIC BLOOD PRESSURE: 78 MMHG | WEIGHT: 124 LBS

## 2019-06-20 DIAGNOSIS — J45.41 BRONCHITIS, ASTHMATIC, MODERATE PERSISTENT, WITH ACUTE EXACERBATION: Primary | ICD-10-CM

## 2019-06-20 PROCEDURE — 99213 OFFICE O/P EST LOW 20 MIN: CPT | Performed by: PHYSICIAN ASSISTANT

## 2019-06-20 RX ORDER — PREDNISONE 20 MG/1
20 TABLET ORAL DAILY
Qty: 10 TABLET | Refills: 0 | Status: SHIPPED | OUTPATIENT
Start: 2019-06-20 | End: 2020-02-05

## 2019-06-20 NOTE — PROGRESS NOTES
Patient Care Team:  Curry Gill MD as PCP - General (Internal Medicine)    Chief Complaint::   Chief Complaint   Patient presents with   • Asthma     3 day follow up         Subjective     HPI  Pt is here for followup on asthmatic bronchitis.  She was started on a Z ned, by another provider, and developed worsening cough with wheezing.  She has been taking Advair 500/50 twice daily and using ProAir,as needed.  She was given Kenalog 80, medrol dose ned and tussionex cough medication at last appt.  She could not tolerate the Tussionex, due to severe itching.  She was then switched to Doxycycline 100mg BID.  She presents today with improved breathing and feels that her cough is more productive.  She still has persistent wheezing and slept better last night with benadryl alone.          PMH  The following portions of the patient's history were reviewed and updated as appropriate: allergies, current medications, past family history, past medical history, past social history, past surgical history and problem list.    Review of Systems:   Review of Systems   Constitutional: Negative for activity change, appetite change, chills, fatigue and fever.   HENT: Negative for congestion, ear pain and sinus pressure.    Eyes: Negative for blurred vision and double vision.   Respiratory: Positive for cough, chest tightness, shortness of breath and wheezing.    Cardiovascular: Negative for chest pain and palpitations.   Gastrointestinal: Negative for abdominal pain, blood in stool, constipation and nausea.   Endocrine: Negative for cold intolerance and heat intolerance.   Skin: Negative for color change.   Allergic/Immunologic: Positive for environmental allergies. Negative for food allergies.   Neurological: Negative for dizziness, speech difficulty and headache.   Psychiatric/Behavioral: Negative for decreased concentration. The patient is not nervous/anxious.        Vital Signs  Vitals:    06/20/19 0927   BP: 116/78  "  BP Location: Left arm   Patient Position: Sitting   Cuff Size: Adult   Pulse: 60   Temp: 97.6 °F (36.4 °C)   TempSrc: Temporal   Weight: 56.2 kg (124 lb)   Height: 162.6 cm (64.02\")   PainSc: 0-No pain       Labs  No visits with results within 3 Month(s) from this visit.   Latest known visit with results is:   Admission on 02/05/2018, Discharged on 02/07/2018   Component Date Value Ref Range Status   • External Strep Group B Ag 01/15/2018 Negative   Final   • WBC 02/05/2018 6.68  3.50 - 10.80 10*3/mm3 Final   • RBC 02/05/2018 4.08  3.89 - 5.14 10*6/mm3 Final   • Hemoglobin 02/05/2018 12.2  11.5 - 15.5 g/dL Final   • Hematocrit 02/05/2018 36.1  34.5 - 44.0 % Final   • MCV 02/05/2018 88.5  80.0 - 99.0 fL Final   • MCH 02/05/2018 29.9  27.0 - 31.0 pg Final   • MCHC 02/05/2018 33.8  32.0 - 36.0 g/dL Final   • RDW 02/05/2018 13.7  11.3 - 14.5 % Final   • RDW-SD 02/05/2018 44.7  37.0 - 54.0 fl Final   • MPV 02/05/2018 11.5  6.0 - 12.0 fL Final   • Platelets 02/05/2018 122* 150 - 450 10*3/mm3 Final   • ABO Type 02/05/2018 O   Final   • RH type 02/05/2018 Positive   Final   • Antibody Screen 02/05/2018 Negative   Final   • Site 02/05/2018 Cord Venous   Final   • pH, Cord Venous 02/05/2018 7.259  7.190 - 7.460 pH Units Final   • pCO2, Cord Venous 02/05/2018 48.0  30.0 - 60.0 mm Hg Final   • pO2, Cord Venous 02/05/2018 20.7  mm Hg Final   • HCO3, Cord Venous 02/05/2018 21.5* 18.6 - 21.4 mmol/L Final   • Base Excess, Cord Venous 02/05/2018 -6.0* 0.0 - 2.0 mmol/L Final   • O2 Sat, Cord Venous 02/05/2018 38.7  % Final   • Hemoglobin, Blood Gas 02/05/2018 16.9  14 - 18 g/dL Final   • CO2 Content 02/05/2018 22.9  22 - 33 Final   • Barometric Pressure for Blood Gas 02/05/2018   mmHg Final    N/A   • Modality 02/05/2018 N/A   Final   • FIO2 02/05/2018 21  % Final   • O2 Saturation Calculated 02/05/2018   45.0 - 75.0 % Final    Calculated O2 saturation result not reported at this site.   • pH, Cord Arterial 02/05/2018 7.14* 7.20 - " 7.30 pH Units Final   • pCO2, Cord Arterial 02/05/2018 71.0* 43.3 - 54.9 mmHg Final   • pO2, Cord Arterial 02/05/2018 11.4* 11.5 - 43.3 mmHg Final   • HCO3, Cord Arterial 02/05/2018 24.4* 16.9 - 20.5 mmol/L Final   • Base Exc, Cord Arterial 02/05/2018 -6.6* 0.0 - 2.0 mmol/L Final   • O2 Sat, Cord Arterial 02/05/2018 11.2  % Final   • Hemoglobin, Blood Gas 02/05/2018 16.9  14 - 18 g/dL Final   • CO2 Content 02/05/2018 26.5  22 - 33 Final   • Barometric Pressure for Blood Gas 02/05/2018   mmHg Final    N/A   • Modality 02/05/2018 N/A   Final   • FIO2 02/05/2018 21  % Final   • WBC 02/06/2018 9.58  3.50 - 10.80 10*3/mm3 Final   • RBC 02/06/2018 3.54* 3.89 - 5.14 10*6/mm3 Final   • Hemoglobin 02/06/2018 10.4* 11.5 - 15.5 g/dL Final   • Hematocrit 02/06/2018 31.6* 34.5 - 44.0 % Final   • MCV 02/06/2018 89.3  80.0 - 99.0 fL Final   • MCH 02/06/2018 29.4  27.0 - 31.0 pg Final   • MCHC 02/06/2018 32.9  32.0 - 36.0 g/dL Final   • RDW 02/06/2018 14.2  11.3 - 14.5 % Final   • RDW-SD 02/06/2018 46.0  37.0 - 54.0 fl Final   • MPV 02/06/2018 11.3  6.0 - 12.0 fL Final   • Platelets 02/06/2018 117* 150 - 450 10*3/mm3 Final   • Neutrophil % 02/06/2018 82.6* 41.0 - 71.0 % Final   • Lymphocyte % 02/06/2018 10.0* 24.0 - 44.0 % Final   • Monocyte % 02/06/2018 6.9  0.0 - 12.0 % Final   • Eosinophil % 02/06/2018 0.1  0.0 - 3.0 % Final   • Basophil % 02/06/2018 0.1  0.0 - 1.0 % Final   • Immature Grans % 02/06/2018 0.3  0.0 - 0.6 % Final   • Neutrophils, Absolute 02/06/2018 7.91  1.50 - 8.30 10*3/mm3 Final   • Lymphocytes, Absolute 02/06/2018 0.96  0.60 - 4.80 10*3/mm3 Final   • Monocytes, Absolute 02/06/2018 0.66  0.00 - 1.00 10*3/mm3 Final   • Eosinophils, Absolute 02/06/2018 0.01  0.00 - 0.30 10*3/mm3 Final   • Basophils, Absolute 02/06/2018 0.01  0.00 - 0.20 10*3/mm3 Final   • Immature Grans, Absolute 02/06/2018 0.03  0.00 - 0.03 10*3/mm3 Final       Imaging  All imaging past 24 hours:   Imaging Results (last 24 hours)     ** No  results found for the last 24 hours. **            Current Outpatient Medications:   •  ADVAIR DISKUS 500-50 MCG/DOSE DISKUS, INHALE 1 PUFF BY MOUTH EVERY 12 HOURS, Disp: 1 each, Rfl: 5  •  albuterol (PROVENTIL) (2.5 MG/3ML) 0.083% nebulizer solution, Take 2.5 mg by nebulization Every 4 (Four) Hours As Needed for Wheezing., Disp: , Rfl:   •  ALPRAZolam (XANAX) 0.5 MG tablet, Take 1 tablet by mouth 2 (Two) Times a Day As Needed., Disp: , Rfl:   •  doxycycline (ADOXA) 100 MG tablet, Take 1 tablet by mouth 2 (Two) Times a Day., Disp: 14 tablet, Rfl: 0  •  escitalopram (LEXAPRO) 10 MG tablet, Take 1 tablet by mouth Daily. Take one daily, Disp: 30 tablet, Rfl: 2  •  loratadine (CLARITIN) 10 MG tablet, Take 1 tablet by mouth Daily., Disp: , Rfl:   •  predniSONE (DELTASONE) 20 MG tablet, Take 1 tablet by mouth Daily., Disp: 10 tablet, Rfl: 0    Physical Exam:    Physical Exam   Constitutional: She is oriented to person, place, and time. She appears well-developed and well-nourished. No distress.   HENT:   Head: Normocephalic and atraumatic.   Nose: Nose normal.   Mouth/Throat: Oropharynx is clear and moist.   Eyes: Pupils are equal, round, and reactive to light.   Neck: Normal range of motion. Neck supple. No JVD present.   Cardiovascular: Normal rate, regular rhythm and normal heart sounds.   No murmur heard.  Pulmonary/Chest: Effort normal. No stridor. No respiratory distress. She has wheezes.   Musculoskeletal: She exhibits no edema.   Lymphadenopathy:     She has no cervical adenopathy.   Neurological: She is alert and oriented to person, place, and time.   Skin: Skin is warm and dry. She is not diaphoretic.   Psychiatric: She has a normal mood and affect. Her behavior is normal. Judgment and thought content normal.   Nursing note and vitals reviewed.    Room 02=99%, pulse 60bpm    Procedures        Assessment/Plan   Problem List Items Addressed This Visit     None      Visit Diagnoses     Bronchitis, asthmatic,  moderate persistent, with acute exacerbation    -  Primary    COntinue Doxycycline 100mg BID  Continue inhalers as directed.  D/C medrol-Prednisone 20mg BID  Benadryl at night  Muccinex twice daily-drink plenty of water      Relevant Medications    predniSONE (DELTASONE) 20 MG tablet          Return if symptoms worsen or fail to improve.    Plan of care reviewed with patient at the conclusion of today's visit. Education was provided regarding diagnosis, management, and any prescribed or recommended OTC medications.Patient verbalizes understanding of and agreement with management plan.     Silvia Pryor PA-C

## 2019-06-25 ENCOUNTER — OFFICE VISIT (OUTPATIENT)
Dept: INTERNAL MEDICINE | Facility: CLINIC | Age: 39
End: 2019-06-25

## 2019-06-25 ENCOUNTER — HOSPITAL ENCOUNTER (OUTPATIENT)
Dept: GENERAL RADIOLOGY | Facility: HOSPITAL | Age: 39
Discharge: HOME | End: 2019-06-25
Admitting: NURSE PRACTITIONER

## 2019-06-25 ENCOUNTER — TELEPHONE (OUTPATIENT)
Dept: INTERNAL MEDICINE | Facility: CLINIC | Age: 39
End: 2019-06-25

## 2019-06-25 VITALS
HEART RATE: 64 BPM | DIASTOLIC BLOOD PRESSURE: 74 MMHG | TEMPERATURE: 98.3 F | SYSTOLIC BLOOD PRESSURE: 126 MMHG | WEIGHT: 123 LBS | HEIGHT: 64 IN | BODY MASS INDEX: 21 KG/M2

## 2019-06-25 DIAGNOSIS — J45.41 MODERATE PERSISTENT ASTHMA WITH EXACERBATION: ICD-10-CM

## 2019-06-25 DIAGNOSIS — J45.41 MODERATE PERSISTENT ASTHMA WITH EXACERBATION: Primary | ICD-10-CM

## 2019-06-25 PROCEDURE — 99213 OFFICE O/P EST LOW 20 MIN: CPT | Performed by: NURSE PRACTITIONER

## 2019-06-25 PROCEDURE — 71046 X-RAY EXAM CHEST 2 VIEWS: CPT

## 2019-06-25 NOTE — PATIENT INSTRUCTIONS
Chest x-ray today.  Finish your prednisone and doxycycline.  Continue Mucinex twice a day.  Continue Advair and albuterol as you have been.

## 2019-06-25 NOTE — TELEPHONE ENCOUNTER
Finish the prednisone and come back and see me on Thursday so that I can listen to her lungs again.  Drink plenty of water and continue muccinex twice daily.

## 2019-06-25 NOTE — TELEPHONE ENCOUNTER
PT CALLED STATING THAT SHE HAS SEEN YOU A FEW TIMES FOR A COUGH AND WAS TOLD TO CALL YESTERDAY OR TODAY IF SHE ISN'T FEELING 100 %  SHE STATES SHE IS STILL PRETTY STOPPED UP HAS 3 DAYS LEFT OF THE PREDNISONE AND DOXYCYCLINE    WANTS TO KNOW IF SHE SHOULD FINISH THOSE OR COME BACK IN AND SEE YOU

## 2019-06-25 NOTE — TELEPHONE ENCOUNTER
PT CALLED BACK I ADVISED HER PER YOUR NOTE AND SHE INFORMED ME THAT SHE HAD COME TINA SEEN DION TODAY AND SHE HAD SENT HER FOR A CHEST X-RAY   DOES PT STILL NEED TO COME IN AND SEE YOU Thursday

## 2019-06-25 NOTE — PROGRESS NOTES
Alicia Diaz  1980  [id number]  Patient Care Team:  Curry Gill MD as PCP - General (Internal Medicine)    Alicia Diaz is a pleasant 38 y.o. female who presents for evaluation of Bronchitis (5 day follow up )      Chief Complaint   Patient presents with   • Bronchitis     5 day follow up        HPI:   Asthmatic bronchitis follow-up: Symptoms began about 10 days ago.  She was first treated with a Z-Krishan, then on 617 here she was treated with the Medrol Dosepak without improvement.  On 620 she was put on prednisone and doxycycline.  She is already on Advair 500 all year long.  And she has been using her albuterol every 4 hours as instructed.  She is also taking Mucinex twice a day.  She has noticed a little improvement as of today but not as much as she had expected at this point.  This is the worst exacerbation she has had to date.  She does have some rib pain right.  Past Medical History:   Diagnosis Date   • Allergy    • Asthma    • Asthma    • Family history of asthma    • Irritable bowel syndrome      Past Surgical History:   Procedure Laterality Date   •  SECTION Bilateral 2018    Procedure:  SECTION PRIMARY;  Surgeon: Mariam Kim DO;  Location: [facility] LABOR DELIVERY;  Service:    • WISDOM TOOTH EXTRACTION       Family History   Problem Relation Age of Onset   • Diabetes Paternal Grandmother    • Asthma Other      Social History     Tobacco Use   Smoking Status Never Smoker   Smokeless Tobacco Never Used     Allergies   Allergen Reactions   • Bactrim [Sulfamethoxazole-Trimethoprim] Hives, Rash and GI Intolerance       Current Outpatient Medications:   •  ADVAIR DISKUS 500-50 MCG/DOSE DISKUS, INHALE 1 PUFF BY MOUTH EVERY 12 HOURS, Disp: 1 each, Rfl: 5  •  albuterol (PROVENTIL) (2.5 MG/3ML) 0.083% nebulizer solution, Take 2.5 mg by nebulization Every 4 (Four) Hours As Needed for Wheezing., Disp: , Rfl:   •  ALPRAZolam (XANAX) 0.5 MG tablet, Take 1 tablet by mouth 2 (Two)  "Times a Day As Needed., Disp: , Rfl:   •  doxycycline (ADOXA) 100 MG tablet, Take 1 tablet by mouth 2 (Two) Times a Day., Disp: 14 tablet, Rfl: 0  •  escitalopram (LEXAPRO) 10 MG tablet, Take 1 tablet by mouth Daily. Take one daily, Disp: 30 tablet, Rfl: 2  •  loratadine (CLARITIN) 10 MG tablet, Take 1 tablet by mouth Daily., Disp: , Rfl:   •  predniSONE (DELTASONE) 20 MG tablet, Take 1 tablet by mouth Daily., Disp: 10 tablet, Rfl: 0    Review of Systems   Constitutional: Negative for chills, fatigue and fever.   HENT: Negative for congestion, ear pain and sinus pressure.    Respiratory: Positive for cough, chest tightness, shortness of breath and wheezing.    Cardiovascular: Negative for chest pain and palpitations.   Gastrointestinal: Negative for abdominal pain, blood in stool and constipation.   Skin: Negative for color change.   Allergic/Immunologic: Positive for environmental allergies.   Neurological: Negative for dizziness, speech difficulty and headache.   Psychiatric/Behavioral: Negative for decreased concentration. The patient is not nervous/anxious.      /74 (BP Location: Left arm, Patient Position: Sitting, Cuff Size: Adult)   Pulse 64   Temp 98.3 °F (36.8 °C) (Temporal)   Ht 162.6 cm (64.02\")   Wt 55.8 kg (123 lb)   BMI 21.10 kg/m²     Physical Exam   Constitutional: She appears well-developed and well-nourished.   HENT:   Head: Normocephalic and atraumatic.   Right Ear: External ear normal.   Left Ear: External ear normal.   Mouth/Throat: Oropharynx is clear and moist.   Eyes: Conjunctivae and EOM are normal.   Neck: Normal range of motion. Neck supple.   Cardiovascular: Normal rate, regular rhythm and normal heart sounds.   Pulmonary/Chest: Effort normal and breath sounds normal.   Mild expiratory wheezing in all fields.  Rhonchi right lower quadrant.   Abdominal: Soft. Bowel sounds are normal.   Musculoskeletal: Normal range of motion.   Lymphadenopathy:     She has no cervical " adenopathy.   Neurological: She is alert.   Skin: Skin is warm and dry.   Psychiatric: She has a normal mood and affect. Her behavior is normal. Thought content normal.     Assessment/Plan:  Alicia was seen today for bronchitis.    Diagnoses and all orders for this visit:    Moderate persistent asthma with exacerbation  -     XR Chest PA & Lateral; Future       Patient Instructions   Chest x-ray today.  Finish your prednisone and doxycycline.  Continue Mucinex twice a day.  Continue Advair and albuterol as you have been.    Plan of care reviewed with patient at the conclusion of today's visit. Education was provided regarding diagnosis, management and any prescribed or recommended OTC medications.  Patient verbalizes understanding of and agreement with management plan.    Return if symptoms worsen or fail to improve.    *Note that portions of this note were completed with a voice recognition program.  Efforts were made to edit the dictation but occasionally words are transcribed.    Octavia Huggins, MINDY

## 2019-06-26 ENCOUNTER — TELEPHONE (OUTPATIENT)
Dept: INTERNAL MEDICINE | Facility: CLINIC | Age: 39
End: 2019-06-26

## 2019-06-26 NOTE — TELEPHONE ENCOUNTER
Final is not back but prelim looks fine, no pneumonia.  If this changes when I see final we will call her back

## 2019-07-15 RX ORDER — ALPRAZOLAM 0.5 MG/1
TABLET ORAL
Qty: 30 TABLET | Refills: 0 | Status: SHIPPED | OUTPATIENT
Start: 2019-07-15 | End: 2019-10-07 | Stop reason: SDUPTHER

## 2019-10-07 RX ORDER — ALPRAZOLAM 0.5 MG/1
TABLET ORAL
Qty: 30 TABLET | Refills: 0 | Status: SHIPPED | OUTPATIENT
Start: 2019-10-07 | End: 2019-12-28

## 2019-12-28 DIAGNOSIS — F41.9 ANXIETY: Primary | ICD-10-CM

## 2019-12-28 RX ORDER — OSELTAMIVIR PHOSPHATE 75 MG/1
75 CAPSULE ORAL DAILY
Qty: 10 CAPSULE | Refills: 0 | Status: SHIPPED | OUTPATIENT
Start: 2019-12-28 | End: 2020-02-05

## 2019-12-28 RX ORDER — ALPRAZOLAM 0.5 MG/1
TABLET ORAL
Qty: 30 TABLET | Refills: 0 | Status: SHIPPED | OUTPATIENT
Start: 2019-12-28 | End: 2020-03-16 | Stop reason: SDUPTHER

## 2020-02-05 ENCOUNTER — E-VISIT (OUTPATIENT)
Dept: FAMILY MEDICINE CLINIC | Facility: TELEHEALTH | Age: 40
End: 2020-02-05

## 2020-02-05 DIAGNOSIS — J45.41 MODERATE PERSISTENT ASTHMATIC BRONCHITIS WITH ACUTE EXACERBATION: Primary | ICD-10-CM

## 2020-02-05 PROCEDURE — 99422 OL DIG E/M SVC 11-20 MIN: CPT | Performed by: NURSE PRACTITIONER

## 2020-02-05 RX ORDER — DEXTROMETHORPHAN HYDROBROMIDE AND PROMETHAZINE HYDROCHLORIDE 15; 6.25 MG/5ML; MG/5ML
5 SYRUP ORAL NIGHTLY PRN
Qty: 100 ML | Refills: 0 | Status: SHIPPED | OUTPATIENT
Start: 2020-02-05 | End: 2020-08-10

## 2020-02-05 RX ORDER — DOXYCYCLINE HYCLATE 100 MG/1
100 CAPSULE ORAL 2 TIMES DAILY
Qty: 20 CAPSULE | Refills: 0 | Status: SHIPPED | OUTPATIENT
Start: 2020-02-05 | End: 2020-02-15

## 2020-02-05 RX ORDER — ALBUTEROL SULFATE 90 UG/1
2 AEROSOL, METERED RESPIRATORY (INHALATION) EVERY 4 HOURS PRN
Qty: 1 INHALER | Refills: 0 | Status: SHIPPED | OUTPATIENT
Start: 2020-02-05 | End: 2020-03-25

## 2020-02-05 RX ORDER — PREDNISONE 10 MG/1
TABLET ORAL
Qty: 30 TABLET | Refills: 0 | Status: SHIPPED | OUTPATIENT
Start: 2020-02-05 | End: 2020-08-10

## 2020-02-05 NOTE — PATIENT INSTRUCTIONS
Moderate persistent asthmatic bronchitis with acute exacerbation  -     predniSONE (DELTASONE) 10 MG tablet; 5 po x 2 days, then 4 po x 2 days, then 3 po x 2 days, then 2 po x 2 days, then 1 po x 2 days, then stop  -     promethazine-dextromethorphan (PROMETHAZINE-DM) 6.25-15 MG/5ML syrup; Take 5 mL by mouth At Night As Needed for Cough.  -     doxycycline (VIBRAMYCIN) 100 MG capsule; Take 1 capsule by mouth 2 (Two) Times a Day for 10 days.    --take medications as prescribed  --complete 10 day course of antibiotic  --nebulizer treatment at home every 4 hours as needed for chest tightness or wheezing  --increase intake of clear, decaffeinated fluids    **if no improvement in 5-7 days, recommend follow-up with Dr. Curry Gill for further evaluation and treatment    **if symptoms worsen, please follow-up sooner      Asthma, Adult    Asthma is a long-term (chronic) condition that causes recurrent episodes in which the airways become tight and narrow. The airways are the passages that lead from the nose and mouth down into the lungs. Asthma episodes, also called asthma attacks, can cause coughing, wheezing, shortness of breath, and chest pain. The airways can also fill with mucus. During an attack, it can be difficult to breathe. Asthma attacks can range from minor to life threatening.  Asthma cannot be cured, but medicines and lifestyle changes can help control it and treat acute attacks.  What are the causes?  This condition is believed to be caused by inherited (genetic) and environmental factors, but its exact cause is not known.  There are many things that can bring on an asthma attack or make asthma symptoms worse (triggers). Asthma triggers are different for each person. Common triggers include:  · Mold.  · Dust.  · Cigarette smoke.  · Cockroaches.  · Things that can cause allergy symptoms (allergens), such as animal dander or pollen from trees or grass.  · Air pollutants such as household , wood  smoke, smog, or chemical odors.  · Cold air, weather changes, and winds (which increase molds and pollen in the air).  · Strong emotional expressions such as crying or laughing hard.  · Stress.  · Certain medicines (such as aspirin) or types of medicines (such as beta-blockers).  · Sulfites in foods and drinks. Foods and drinks that may contain sulfites include dried fruit, potato chips, and sparkling grape juice.  · Infections or inflammatory conditions such as the flu, a cold, or inflammation of the nasal membranes (rhinitis).  · Gastroesophageal reflux disease (GERD).  · Exercise or strenuous activity.  What are the signs or symptoms?  Symptoms of this condition may occur right after asthma is triggered or many hours later. Symptoms include:  · Wheezing. This can sound like whistling when you breathe.  · Excessive nighttime or early morning coughing.  · Frequent or severe coughing with a common cold.  · Chest tightness.  · Shortness of breath.  · Tiredness (fatigue) with minimal activity.  How is this diagnosed?  This condition is diagnosed based on:  · Your medical history.  · A physical exam.  · Tests, which may include:  ? Lung function studies and pulmonary studies (spirometry). These tests can evaluate the flow of air in your lungs.  ? Allergy tests.  ? Imaging tests, such as X-rays.  How is this treated?  There is no cure for this condition, but treatment can help control your symptoms. Treatment for asthma usually involves:  · Identifying and avoiding your asthma triggers.  · Using medicines to control your symptoms. Generally, two types of medicines are used to treat asthma:  ? Controller medicines. These help prevent asthma symptoms from occurring. They are usually taken every day.  ? Fast-acting reliever or rescue medicines. These quickly relieve asthma symptoms by widening the narrow and tight airways. They are used as needed and provide short-term relief.  · Using supplemental oxygen. This may be  needed during a severe episode.  · Using other medicines, such as:  ? Allergy medicines, such as antihistamines, if your asthma attacks are triggered by allergens.  ? Immune medicines (immunomodulators). These are medicines that help control the immune system.  · Creating an asthma action plan. An asthma action plan is a written plan for managing and treating your asthma attacks. This plan includes:  ? A list of your asthma triggers and how to avoid them.  ? Information about when medicines should be taken and when their dosage should be changed.  ? Instructions about using a device called a peak flow meter. A peak flow meter measures how well the lungs are working and the severity of your asthma. It helps you monitor your condition.  Follow these instructions at home:  Controlling your home environment  Control your home environment in the following ways to help avoid triggers and prevent asthma attacks:  · Change your heating and air conditioning filter regularly.  · Limit your use of fireplaces and wood stoves.  · Get rid of pests (such as roaches and mice) and their droppings.  · Throw away plants if you see mold on them.  · Clean floors and dust surfaces regularly. Use unscented cleaning products.  · Try to have someone else vacuum for you regularly. Stay out of rooms while they are being vacuumed and for a short while afterward. If you vacuum, use a dust mask from a hardware store, a double-layered or microfilter vacuum  bag, or a vacuum  with a HEPA filter.  · Replace carpet with wood, tile, or vinyl preethi. Carpet can trap dander and dust.  · Use allergy-proof pillows, mattress covers, and box spring covers.  · Keep your bedroom a trigger-free room.  · Avoid pets and keep windows closed when allergens are in the air.  · Wash beddings every week in hot water and dry them in a dryer.  · Use blankets that are made of polyester or cotton.  · Clean bathrooms and [place] with bleach. If possible,  have someone repaint the walls in these rooms with mold-resistant paint. Stay out of the rooms that are being cleaned and painted.  · Wash your hands often with soap and water. If soap and water are not available, use hand .  · Do not allow anyone to smoke in your home.  General instructions  · Take over-the-counter and prescription medicines only as told by your health care provider.  ? Speak with your health care provider if you have questions about how or when to take the medicines.  ? Make note if you are requiring more frequent dosages.  · Do not use any products that contain nicotine or tobacco, such as cigarettes and e-cigarettes. If you need help quitting, ask your health care provider. Also, avoid being exposed to secondhand smoke.  · Use a peak flow meter as told by your health care provider. Record and keep track of the readings.  · Understand and use the asthma action plan to help minimize, or stop an asthma attack, without needing to seek medical care.  · Make sure you stay up to date on your yearly vaccinations as told by your health care provider. This may include vaccines for the flu and pneumonia.  · Avoid outdoor activities when allergen counts are high and when air quality is low.  · Wear a ski mask that covers your nose and mouth during outdoor winter activities. Exercise indoors on cold days if you can.  · Warm up before exercising, and take time for a cool-down period after exercise.  · Keep all follow-up visits as told by your health care provider. This is important.  Where to find more information  · For information about asthma, turn to the Centers for Disease Control and Prevention at www.cdc.gov/asthma/faqs.htm  · For air quality information, turn to AirNow at https://airnow.gov/  Contact a health care provider if:  · You have wheezing, shortness of breath, or a cough even while you are taking medicine to prevent attacks.  · The mucus you cough up (sputum) is thicker than  usual.  · Your sputum changes from clear or white to yellow, green, gray, or bloody.  · Your medicines are causing side effects, such as a rash, itching, swelling, or trouble breathing.  · You need to use a reliever medicine more than 2-3 times a week.  · Your peak flow reading is still at 50-79% of your personal best after following your action plan for 1 hour.  · You have a fever.  Get help right away if:  · You are getting worse and do not respond to treatment during an asthma attack.  · You are short of breath when at rest or when doing very little physical activity.  · You have difficulty eating, drinking, or talking.  · You have chest pain or tightness.  · You develop a fast heartbeat or palpitations.  · You have a bluish color to your lips or fingernails.  · You are light-headed or dizzy, or you faint.  · Your peak flow reading is less than 50% of your personal best.  · You feel too tired to breathe normally.  Summary  · Asthma is a long-term (chronic) condition that causes recurrent episodes in which the airways become tight and narrow. These episodes can cause coughing, wheezing, shortness of breath, and chest pain.  · Asthma cannot be cured, but medicines and lifestyle changes can help control it and treat acute attacks.  · Make sure you understand how to avoid triggers and how and when to use your medicines.  · Asthma attacks can range from minor to life threatening. Get help right away if you have an asthma attack and do not respond to treatment with your usual rescue medicines.  This information is not intended to replace advice given to you by your health care provider. Make sure you discuss any questions you have with your health care provider.  Document Released: 12/18/2006 Document Revised: 01/22/2018 Document Reviewed: 01/22/2018  [employer] Interactive Patient Education © 2019 [employer]    Acute Bronchitis, Adult    Acute bronchitis is sudden (acute) swelling of the air tubes (bronchi) in the  lungs. Acute bronchitis causes these tubes to fill with mucus, which can make it hard to breathe. It can also cause coughing or wheezing.  In adults, acute bronchitis usually goes away within 2 weeks. A cough caused by bronchitis may last up to 3 weeks. Smoking, allergies, and asthma can make the condition worse. Repeated episodes of bronchitis may cause further lung problems, such as chronic obstructive pulmonary disease (COPD).  What are the causes?  This condition can be caused by germs and by substances that irritate the lungs, including:  · Cold and flu viruses. This condition is most often caused by the same virus that causes a cold.  · Bacteria.  · Exposure to tobacco smoke, dust, fumes, and air pollution.  What increases the risk?  This condition is more likely to develop in people who:  · Have close contact with someone with acute bronchitis.  · Are exposed to lung irritants, such as tobacco smoke, dust, fumes, and vapors.  · Have a weak immune system.  · Have a respiratory condition such as asthma.  What are the signs or symptoms?  Symptoms of this condition include:  · A cough.  · Coughing up clear, yellow, or green mucus.  · Wheezing.  · Chest congestion.  · Shortness of breath.  · A fever.  · Body aches.  · Chills.  · A sore throat.  How is this diagnosed?  This condition is usually diagnosed with a physical exam. During the exam, your health care provider may order tests, such as chest X-rays, to rule out other conditions. He or she may also:  · Test a sample of your mucus for bacterial infection.  · Check the level of oxygen in your blood. This is done to check for pneumonia.  · Do a chest X-ray or lung function testing to rule out pneumonia and other conditions.  · Perform blood tests.  Your health care provider will also ask about your symptoms and medical history.  How is this treated?  Most cases of acute bronchitis clear up over time without treatment. Your health care provider may  recommend:  · Drinking more fluids. Drinking more makes your mucus thinner, which may make it easier to breathe.  · Taking a medicine for a fever or cough.  · Taking an antibiotic medicine.  · Using an inhaler to help improve shortness of breath and to control a cough.  · Using a cool mist vaporizer or humidifier to make it easier to breathe.  Follow these instructions at home:  Medicines  · Take over-the-counter and prescription medicines only as told by your health care provider.  · If you were prescribed an antibiotic, take it as told by your health care provider. Do not stop taking the antibiotic even if you start to feel better.  General instructions    · Get plenty of rest.  · Drink enough fluids to keep your urine pale yellow.  · Avoid smoking and secondhand smoke. Exposure to cigarette smoke or irritating chemicals will make bronchitis worse. If you smoke and you need help quitting, ask your health care provider. Quitting smoking will help your lungs heal faster.  · Use an inhaler, cool mist vaporizer, or humidifier as told by your health care provider.  · Keep all follow-up visits as told by your health care provider. This is important.  How is this prevented?  To lower your risk of getting this condition again:  · Wash your hands often with soap and water. If soap and water are not available, use hand .  · Avoid contact with people who have cold symptoms.  · Try not to touch your hands to your mouth, nose, or eyes.  · Make sure to get the flu shot every year.  Contact a health care provider if:  · Your symptoms do not improve in 2 weeks of treatment.  Get help right away if:  · You cough up blood.  · You have chest pain.  · You have severe shortness of breath.  · You become dehydrated.  · You faint or keep feeling like you are going to faint.  · You keep vomiting.  · You have a severe headache.  · Your fever or chills gets worse.  This information is not intended to replace advice given to you by  your health care provider. Make sure you discuss any questions you have with your health care provider.  Document Released: 01/25/2006 Document Revised: 08/01/2018 Document Reviewed: 06/07/2017  [employer] Interactive Patient Education © 2019 [employer]    Bronchospasm, Adult    Bronchospasm is a tightening of the airways going into the lungs. During an episode, it may be harder to breathe. You may cough, and you may make a whistling sound when you breathe (wheeze).  This condition often affects people with asthma.  What are the causes?  This condition is caused by swelling and irritation in the airways. It can be triggered by:  · An infection (common).  · Seasonal allergies.  · An allergic reaction.  · Exercise.  · Irritants. These include pollution, cigarette smoke, strong odors, aerosol sprays, and paint fumes.  · Weather changes. Winds increase molds and pollens in the air. Cold air may cause swelling.  · Stress and emotional upset.  What are the signs or symptoms?  Symptoms of this condition include:  · Wheezing. If the episode was triggered by an allergy, wheezing may start right away or hours later.  · Nighttime coughing.  · Frequent or severe coughing with a simple cold.  · Chest tightness.  · Shortness of breath.  · Decreased ability to exercise.  How is this diagnosed?  This condition is usually diagnosed with a review of your medical history and a physical exam. Tests, such as lung function tests, are sometimes done to look for other conditions. The need for a chest X-ray depends on where the wheezing occurs and whether it is the first time you have wheezed.  How is this treated?  This condition may be treated with:  · Inhaled medicines. These open up the airways and help you breathe. They can be taken with an inhaler or a nebulizer device.  · Corticosteroid medicines. These may be given for severe bronchospasm, usually when it is associated with asthma.  · Avoiding triggers, such as irritants,  infection, or allergies.  Follow these instructions at home:  Medicines  · Take over-the-counter and prescription medicines only as told by your health care provider.  · If you need to use an inhaler or nebulizer to take your medicine, ask your health care provider to explain how to use it correctly. If you were given a spacer, always use it with your inhaler.  Lifestyle  · Reduce the number of triggers in your home. To do this:  ? Change your heating and air conditioning filter at least once a month.  ? Limit your use of fireplaces and wood stoves.  ? Do not smoke. Do not allow smoking in your home.  ? Avoid using perfumes and fragrances.  ? Get rid of pests, such as roaches and mice, and their droppings.  ? Remove any mold from your home.  ? Keep your house clean and dust free. Use unscented cleaning products.  ? Replace carpet with wood, tile, or vinyl preethi. Carpet can trap dander and dust.  ? Use allergy-proof pillows, mattress covers, and box spring covers.  ? Wash bed sheets and blankets every week in hot water. Dry them in a dryer.  ? Use blankets that are made of polyester or cotton.  ? Wash your hands often.  ? Do not allow pets in your bedroom.  · Avoid breathing in cold air when you exercise.  General instructions  · Have a plan for seeking medical care. Know when to call your health care provider and local emergency services, and where to get emergency care.  · Stay up to date on your immunizations.  · When you have an episode of bronchospasm, stay calm. Try to relax and breathe more slowly.  · If you have asthma, make sure you have an asthma action plan.  · Keep all follow-up visits as told by your health care provider. This is important.  Contact a health care provider if:  · You have muscle aches.  · You have chest pain.  · The mucus that you cough up (sputum) changes from clear or white to yellow, green, gray, or bloody.  · You have a fever.  · Your sputum gets thicker.  Get help right away  if:  · Your wheezing and coughing get worse, even after you take your prescribed medicines.  · It gets even harder to breathe.  · You develop severe chest pain.  Summary  · Bronchospasm is a tightening of the airways going into the lungs.  · During an episode of bronchospasm, you may have a harder time breathing. You may cough and make a whistling sound when you breathe (wheeze).  · Avoid exposure to triggers such as smoke, dust, mold, animal dander, and fragrances.  · When you have an episode of bronchospasm, stay calm. Try to relax and breathe more slowly.  This information is not intended to replace advice given to you by your health care provider. Make sure you discuss any questions you have with your health care provider.  Document Released: 12/20/2004 Document Revised: 12/14/2017 Document Reviewed: 12/14/2017  [employer] Interactive Patient Education © 2019 [employer]

## 2020-02-05 NOTE — PROGRESS NOTES
Alicia Diaz    1980  [id number]    I have reviewed the e-Visit questionnaire and patient's answers, my assessment and plan are as follows:    HPI  <1 week history of intermittent coughing spasms which are keeping her up at night; she also has sinus congestion, sore throat, headache; no fever; she describes the cough as productive with clear sputum; she has asthma and gets asthmatic bronchitis usually a couple of times a year.  She has been taking mucinex with very little relief    Review of Systems - History obtained from the patient  General ROS:   --positive for  - fatigue and sleep disturbance  --negative for - fever or night sweats  ENT ROS:   --positive for - headaches, nasal congestion, sinus pain and sore throat  --negative for - sneezing, tinnitus or vertigo  Allergy and Immunology ROS:   --positive for - nasal congestion, postnasal drip and seasonal allergies  --negative for - itchy/watery eyes  Respiratory ROS:   --positive for - cough and wheezing  --negative for - pleuritic pain, shortness of breath or sputum changes      Diagnoses and all orders for this visit:    Moderate persistent asthmatic bronchitis with acute exacerbation  -     predniSONE (DELTASONE) 10 MG tablet; 5 po x 2 days, then 4 po x 2 days, then 3 po x 2 days, then 2 po x 2 days, then 1 po x 2 days, then stop  -     promethazine-dextromethorphan (PROMETHAZINE-DM) 6.25-15 MG/5ML syrup; Take 5 mL by mouth At Night As Needed for Cough.  -     doxycycline (VIBRAMYCIN) 100 MG capsule; Take 1 capsule by mouth 2 (Two) Times a Day for 10 days.    --take medications as prescribed  --complete 10 day course of antibiotic  --nebulizer treatment at home every 4 hours as needed for chest tightness or wheezing  --increase intake of clear, decaffeinated fluids    **if no improvement in 5-7 days, recommend follow-up with Dr. Curry Gill for further evaluation and treatment    **if symptoms worsen, please follow-up sooner      Any medications  prescribed have been sent electronically to   [id number] - [place], [place] - [address] AT [facility] [address] - [phone]  - [phone]   [address]  [facility] [address]  Phone: [phone] Fax: [phone]        MINDY Vuong  02/05/20  9:51 AM

## 2020-03-16 DIAGNOSIS — F41.9 ANXIETY: ICD-10-CM

## 2020-03-16 RX ORDER — ALPRAZOLAM 0.5 MG/1
TABLET ORAL
Qty: 30 TABLET | OUTPATIENT
Start: 2020-03-16

## 2020-03-16 RX ORDER — ALPRAZOLAM 0.5 MG/1
0.5 TABLET ORAL 2 TIMES DAILY PRN
Qty: 30 TABLET | Refills: 0 | Status: SHIPPED | OUTPATIENT
Start: 2020-03-16 | End: 2020-08-10 | Stop reason: SDUPTHER

## 2020-03-25 DIAGNOSIS — J45.41 MODERATE PERSISTENT ASTHMATIC BRONCHITIS WITH ACUTE EXACERBATION: ICD-10-CM

## 2020-03-25 RX ORDER — ALBUTEROL SULFATE 90 UG/1
AEROSOL, METERED RESPIRATORY (INHALATION)
Qty: 8.5 G | Refills: 5 | Status: SHIPPED | OUTPATIENT
Start: 2020-03-25 | End: 2021-12-02

## 2020-08-10 ENCOUNTER — OFFICE VISIT (OUTPATIENT)
Dept: INTERNAL MEDICINE | Facility: CLINIC | Age: 40
End: 2020-08-10

## 2020-08-10 VITALS
DIASTOLIC BLOOD PRESSURE: 78 MMHG | HEART RATE: 72 BPM | WEIGHT: 135 LBS | BODY MASS INDEX: 23.05 KG/M2 | HEIGHT: 64 IN | SYSTOLIC BLOOD PRESSURE: 110 MMHG | TEMPERATURE: 97.1 F

## 2020-08-10 DIAGNOSIS — F41.9 ANXIETY: ICD-10-CM

## 2020-08-10 DIAGNOSIS — J45.40 MODERATE PERSISTENT ASTHMA WITHOUT COMPLICATION: Primary | ICD-10-CM

## 2020-08-10 PROCEDURE — 99213 OFFICE O/P EST LOW 20 MIN: CPT | Performed by: INTERNAL MEDICINE

## 2020-08-10 RX ORDER — ALPRAZOLAM 0.5 MG/1
0.5 TABLET ORAL 2 TIMES DAILY PRN
Qty: 30 TABLET | Refills: 1 | Status: SHIPPED | OUTPATIENT
Start: 2020-08-10 | End: 2021-01-07

## 2020-08-10 NOTE — PROGRESS NOTES
[facility]     Alicia Diaz  1980   [id number]      Patient Care Team:  Curry Gill MD as PCP - General (Internal Medicine)    Chief Complaint::   Chief Complaint   Patient presents with   • Asthma        HPI  Ms. Diaz comes in for follow-up of her anxiety and asthma.  She is doing very well.  She is due for renewal on her alprazolam prescription.  She takes this very infrequently, the last prescription of 30 tablets was given 6 months ago.  Her asthma and allergies are also reasonably well controlled.    Chronic Conditions:      Patient Active Problem List   Diagnosis   • S/P emergency  section   • Asthma   • Anxiety   • Insomnia   • Irritable bowel syndrome   • Acute bacterial rhinosinusitis   • Allergic rhinitis   • Depression        Past Medical History:   Diagnosis Date   • Allergy    • Asthma    • Asthma    • Family history of asthma    • Irritable bowel syndrome        Past Surgical History:   Procedure Laterality Date   •  SECTION Bilateral 2018    Procedure:  SECTION PRIMARY;  Surgeon: Mariam Kim DO;  Location: [facility] LABOR DELIVERY;  Service:    • WISDOM TOOTH EXTRACTION         Family History   Problem Relation Age of Onset   • Diabetes Paternal Grandmother    • Asthma Other        Social History     Socioeconomic History   • Marital status:      Spouse name: Not on file   • Number of children: Not on file   • Years of education: Not on file   • Highest education level: Not on file   Tobacco Use   • Smoking status: Never Smoker   • Smokeless tobacco: Never Used   Substance and Sexual Activity   • Alcohol use: No   • Drug use: No   • Sexual activity: Yes     Partners: Male       Allergies   Allergen Reactions   • Bactrim [Sulfamethoxazole-Trimethoprim] Hives, Rash and GI Intolerance         Current Outpatient Medications:   •  albuterol (PROVENTIL) (2.5 MG/3ML) 0.083% nebulizer solution, Take 2.5 mg by nebulization Every 4 (Four)  "Hours As Needed for Wheezing., Disp: , Rfl:   •  ALBUTEROL SULFATE  (90 Base) MCG/ACT inhaler, INHALE 2 PUFFS BY MOUTH EVERY 4 HOURS AS NEEDED FOR WHEEZING, Disp: 8.5 g, Rfl: 5  •  ALPRAZolam (XANAX) 0.5 MG tablet, Take 1 tablet by mouth 2 (Two) Times a Day As Needed for Anxiety., Disp: 30 tablet, Rfl: 1  •  fluticasone-salmeterol (Advair Diskus) 500-50 MCG/DOSE DISKUS, Inhale 1 puff 2 (Two) Times a Day., Disp: 60 each, Rfl: 5  •  loratadine (CLARITIN) 10 MG tablet, Take 1 tablet by mouth Daily., Disp: , Rfl:   •  Tiotropium Bromide Monohydrate (SPIRIVA RESPIMAT) 1.25 MCG/ACT aerosol solution inhaler, Inhale 2 puffs Daily., Disp: 1 inhaler, Rfl: 2    Review of Systems   Constitutional: Negative for chills, fatigue and fever.   HENT: Negative for congestion, ear pain and sinus pressure.    Respiratory: Negative for cough, chest tightness, shortness of breath and wheezing.    Cardiovascular: Negative for chest pain and palpitations.   Gastrointestinal: Negative for abdominal pain, blood in stool and constipation.   Skin: Negative for color change.   Allergic/Immunologic: Negative for environmental allergies.   Neurological: Negative for dizziness, speech difficulty and headache.   Psychiatric/Behavioral: Negative for decreased concentration. The patient is not nervous/anxious.         Vital Signs  Vitals:    08/10/20 1139   BP: 110/78   BP Location: Right arm   Patient Position: Sitting   Cuff Size: Adult   Pulse: 72   Temp: 97.1 °F (36.2 °C)   Weight: 61.2 kg (135 lb)   Height: 162.6 cm (64\")   PainSc: 0-No pain       Physical Exam   Constitutional: She is oriented to person, place, and time. She appears well-developed and well-nourished.   HENT:   Head: Normocephalic and atraumatic.   Cardiovascular: Normal rate, regular rhythm and normal heart sounds.   No murmur heard.  Pulmonary/Chest: Effort normal and breath sounds normal.   Neurological: She is alert and oriented to person, place, and time. "   Psychiatric: She has a normal mood and affect.   Vitals reviewed.     Procedures    ACE III MINI             Assessment/Plan:    Alicia was seen today for asthma.    Diagnoses and all orders for this visit:    Moderate persistent asthma without complication    Anxiety  -     ALPRAZolam (XANAX) 0.5 MG tablet; Take 1 tablet by mouth 2 (Two) Times a Day As Needed for Anxiety.    Other orders  -     fluticasone-salmeterol (Advair Diskus) 500-50 MCG/DOSE DISKUS; Inhale 1 puff 2 (Two) Times a Day.    Plan    Anxiety is very well controlled.  She uses alprazolam very infrequently as needed to help her sleep.    She will continue Advair and Spiriva for her asthma.  She will follow-up with her allergist.      Plan of care reviewed with patient at the conclusion of today's visit. Education was provided regarding diagnosis, management, and any prescribed or recommended OTC medications.Patient verbalizes understanding of and agreement with management plan.         Curry Gill MD

## 2020-09-17 RX ORDER — ESCITALOPRAM OXALATE 10 MG/1
10 TABLET ORAL DAILY
Qty: 30 TABLET | Refills: 1 | Status: SHIPPED | OUTPATIENT
Start: 2020-09-17 | End: 2021-03-05

## 2021-01-07 DIAGNOSIS — F41.9 ANXIETY: ICD-10-CM

## 2021-01-07 RX ORDER — ALPRAZOLAM 0.5 MG/1
TABLET ORAL
Qty: 30 TABLET | Refills: 2 | Status: SHIPPED | OUTPATIENT
Start: 2021-01-07 | End: 2021-07-15 | Stop reason: SDUPTHER

## 2021-01-22 ENCOUNTER — IMMUNIZATION (OUTPATIENT)
Dept: VACCINE CLINIC | Facility: HOSPITAL | Age: 41
End: 2021-01-22

## 2021-01-22 PROCEDURE — 91300 HC SARSCOV02 VAC 30MCG/0.3ML IM: CPT | Performed by: FAMILY MEDICINE

## 2021-01-22 PROCEDURE — 0001A: CPT | Performed by: FAMILY MEDICINE

## 2021-02-12 ENCOUNTER — IMMUNIZATION (OUTPATIENT)
Dept: VACCINE CLINIC | Facility: HOSPITAL | Age: 41
End: 2021-02-12

## 2021-02-12 PROCEDURE — 91300 HC SARSCOV02 VAC 30MCG/0.3ML IM: CPT | Performed by: INTERNAL MEDICINE

## 2021-02-12 PROCEDURE — 0002A: CPT | Performed by: INTERNAL MEDICINE

## 2021-03-05 RX ORDER — TIOTROPIUM BROMIDE INHALATION SPRAY 1.56 UG/1
2 SPRAY, METERED RESPIRATORY (INHALATION) DAILY
Qty: 4 G | Refills: 1 | Status: SHIPPED | OUTPATIENT
Start: 2021-03-05 | End: 2021-05-13

## 2021-03-05 RX ORDER — ESCITALOPRAM OXALATE 10 MG/1
10 TABLET ORAL DAILY
Qty: 30 TABLET | Refills: 5 | Status: SHIPPED | OUTPATIENT
Start: 2021-03-05 | End: 2021-07-15 | Stop reason: SDUPTHER

## 2021-05-13 RX ORDER — TIOTROPIUM BROMIDE INHALATION SPRAY 1.56 UG/1
SPRAY, METERED RESPIRATORY (INHALATION)
Qty: 4 G | Refills: 1 | Status: SHIPPED | OUTPATIENT
Start: 2021-05-13

## 2021-07-15 ENCOUNTER — OFFICE VISIT (OUTPATIENT)
Dept: INTERNAL MEDICINE | Facility: CLINIC | Age: 41
End: 2021-07-15

## 2021-07-15 VITALS
OXYGEN SATURATION: 98 % | HEART RATE: 76 BPM | WEIGHT: 139 LBS | BODY MASS INDEX: 23.73 KG/M2 | DIASTOLIC BLOOD PRESSURE: 70 MMHG | TEMPERATURE: 98.4 F | SYSTOLIC BLOOD PRESSURE: 90 MMHG | HEIGHT: 64 IN

## 2021-07-15 DIAGNOSIS — M54.6 ACUTE LEFT-SIDED THORACIC BACK PAIN: Primary | ICD-10-CM

## 2021-07-15 DIAGNOSIS — F41.9 ANXIETY: ICD-10-CM

## 2021-07-15 DIAGNOSIS — M54.2 CHRONIC NECK PAIN: ICD-10-CM

## 2021-07-15 DIAGNOSIS — G89.29 CHRONIC NECK PAIN: ICD-10-CM

## 2021-07-15 PROCEDURE — 99213 OFFICE O/P EST LOW 20 MIN: CPT | Performed by: INTERNAL MEDICINE

## 2021-07-15 RX ORDER — ALPRAZOLAM 0.5 MG/1
0.5 TABLET ORAL 2 TIMES DAILY PRN
Qty: 30 TABLET | Refills: 2 | Status: SHIPPED | OUTPATIENT
Start: 2021-07-15 | End: 2022-02-10

## 2021-07-15 RX ORDER — ESCITALOPRAM OXALATE 10 MG/1
10 TABLET ORAL DAILY
Qty: 30 TABLET | Refills: 5 | Status: SHIPPED | OUTPATIENT
Start: 2021-07-15 | End: 2022-07-18

## 2021-07-15 RX ORDER — CYCLOBENZAPRINE HCL 10 MG
10 TABLET ORAL NIGHTLY PRN
Qty: 30 TABLET | Refills: 0 | Status: SHIPPED | OUTPATIENT
Start: 2021-07-15 | End: 2021-08-04

## 2021-07-15 NOTE — PROGRESS NOTES
[facility]     Alicia Diaz  1980   [id number]      Patient Care Team:  Curry Gill MD as PCP - General (Internal Medicine)    Chief Complaint::   Chief Complaint   Patient presents with   • Back Pain   • Med Refill        HPI  Ms. Diaz comes in with 1 week history of significant upper back pain.  She notes that for years she has had neck and shoulder pain which she feels is due to tension.  She does not feel anxious but has difficulty relaxing her neck and shoulders.  A week ago she was on the couch with her daughter and leaned forward and had the sudden sensation of a sharp pain around the left scapula.  That has persisted.  It makes it difficult for her to sleep at night, despite taking Tylenol and using heat and ice she has had no relief.  A massage did not help.  Most movement aggravates it.  Rest does not necessarily help.  Her anxiety appears to be well controlled on Lexapro.  She does not take alprazolam very often.    Chronic Conditions:      Patient Active Problem List   Diagnosis   • S/P emergency  section   • Asthma   • Anxiety   • Insomnia   • Irritable bowel syndrome   • Acute bacterial rhinosinusitis   • Allergic rhinitis   • Depression        Past Medical History:   Diagnosis Date   • Allergy    • Asthma    • Asthma    • Family history of asthma    • Irritable bowel syndrome        Past Surgical History:   Procedure Laterality Date   •  SECTION Bilateral 2018    Procedure:  SECTION PRIMARY;  Surgeon: Mariam Kim DO;  Location: [facility] LABOR DELIVERY;  Service:    • WISDOM TOOTH EXTRACTION         Family History   Problem Relation Age of Onset   • Diabetes Paternal Grandmother    • Asthma Other        Social History     Socioeconomic History   • Marital status:      Spouse name: Not on file   • Number of children: Not on file   • Years of education: Not on file   • Highest education level: Not on file   Tobacco Use   • Smoking  "status: Never Smoker   • Smokeless tobacco: Never Used   Vaping Use   • Vaping Use: Never used   Substance and Sexual Activity   • Alcohol use: No   • Drug use: No   • Sexual activity: Yes     Partners: Male       Allergies   Allergen Reactions   • Bactrim [Sulfamethoxazole-Trimethoprim] Hives, Rash and GI Intolerance         Current Outpatient Medications:   •  Advair Diskus 500-50 MCG/DOSE DISKUS, INHALE 1 PUFF BY MOUTH TWICE DAILY, Disp: 60 each, Rfl: 5  •  albuterol (PROVENTIL) (2.5 MG/3ML) 0.083% nebulizer solution, Take 2.5 mg by nebulization Every 4 (Four) Hours As Needed for Wheezing., Disp: , Rfl:   •  ALBUTEROL SULFATE  (90 Base) MCG/ACT inhaler, INHALE 2 PUFFS BY MOUTH EVERY 4 HOURS AS NEEDED FOR WHEEZING, Disp: 8.5 g, Rfl: 5  •  ALPRAZolam (XANAX) 0.5 MG tablet, Take 1 tablet by mouth 2 (Two) Times a Day As Needed for Anxiety. for anxiety, Disp: 30 tablet, Rfl: 2  •  escitalopram (LEXAPRO) 10 MG tablet, Take 1 tablet by mouth Daily., Disp: 30 tablet, Rfl: 5  •  loratadine (CLARITIN) 10 MG tablet, Take 1 tablet by mouth Daily., Disp: , Rfl:   •  Spiriva Respimat 1.25 MCG/ACT aerosol solution inhaler, INHALE 2 PUFFS BY MOUTH DAILY, Disp: 4 g, Rfl: 1  •  cyclobenzaprine (FLEXERIL) 10 MG tablet, Take 1 tablet by mouth At Night As Needed for Muscle Spasms., Disp: 30 tablet, Rfl: 0    Review of Systems   Constitutional: Negative.    Respiratory: Negative.  Negative for chest tightness and shortness of breath.    Cardiovascular: Negative.  Negative for chest pain.   Gastrointestinal: Negative for abdominal pain, blood in stool, constipation and diarrhea.   Musculoskeletal: Positive for myalgias and neck pain.        Vital Signs  Vitals:    07/15/21 1126   BP: 90/70   BP Location: Left arm   Patient Position: Sitting   Cuff Size: Adult   Pulse: 76   Temp: 98.4 °F (36.9 °C)   TempSrc: Temporal   SpO2: 98%   Weight: 63 kg (139 lb)   Height: 162.6 cm (64.02\")   PainSc:   5   PainLoc: Back  Comment: gets " worse at night       Physical Exam  Constitutional:       General: She is not in acute distress.  Neck:      Vascular: No carotid bruit.   Musculoskeletal:      Cervical back: Normal range of motion and neck supple. Tenderness present.      Comments: There is tenderness to palpation in the left paraspinous musculature from the neck to mid thoracic region.  There is also tenderness in the left scapular region as well.   Lymphadenopathy:      Cervical: No cervical adenopathy.   Neurological:      Mental Status: She is alert.          Procedures    ACE III MINI             Assessment/Plan:    Diagnoses and all orders for this visit:    1. Acute left-sided thoracic back pain (Primary)    2. Anxiety  -     ALPRAZolam (XANAX) 0.5 MG tablet; Take 1 tablet by mouth 2 (Two) Times a Day As Needed for Anxiety. for anxiety  Dispense: 30 tablet; Refill: 2    3. Chronic neck pain    Other orders  -     cyclobenzaprine (FLEXERIL) 10 MG tablet; Take 1 tablet by mouth At Night As Needed for Muscle Spasms.  Dispense: 30 tablet; Refill: 0  -     escitalopram (LEXAPRO) 10 MG tablet; Take 1 tablet by mouth Daily.  Dispense: 30 tablet; Refill: 5    Plan    Acute soft tissue injury of the left upper back.  She will continue using heat and ice.  I have suggested ibuprofen 600 mg twice a day with meals.  She is given cyclobenzaprine to take at bedtime.  Her most aggressive approach would be physical therapy and she is considering getting an appointment.    Her neck pain is due to holding tension in her neck and shoulder musculature constantly.  We discussed biofeedback technique for after heat and stretching she concentrates on relaxing of the muscles.    Anxiety is well controlled on Lexapro and as needed Xanax.      Plan of care reviewed with patient at the conclusion of today's visit. Education was provided regarding diagnosis, management, and any prescribed or recommended OTC medications.Patient verbalizes understanding of and agreement  with management plan.         Curry Gill MD

## 2021-08-04 RX ORDER — CYCLOBENZAPRINE HCL 10 MG
10 TABLET ORAL NIGHTLY PRN
Qty: 30 TABLET | Refills: 0 | Status: SHIPPED | OUTPATIENT
Start: 2021-08-04 | End: 2021-08-25

## 2021-08-25 RX ORDER — CYCLOBENZAPRINE HCL 10 MG
10 TABLET ORAL NIGHTLY PRN
Qty: 30 TABLET | Refills: 2 | Status: SHIPPED | OUTPATIENT
Start: 2021-08-25 | End: 2022-08-15

## 2021-08-25 NOTE — TELEPHONE ENCOUNTER
Rx Refill Note  Requested Prescriptions     Pending Prescriptions Disp Refills   • cyclobenzaprine (FLEXERIL) 10 MG tablet [Pharmacy Med Name: CYCLOBENZAPRINE 10MG TABLETS] 30 tablet 0     Sig: TAKE 1 TABLET BY MOUTH AT NIGHT AS NEEDED FOR MUSCLE SPASMS      Last office visit with prescribing clinician: 7/15/2021      Next office visit with prescribing clinician: 7/19/2022            Lila Mcgill LPN  08/25/21, 10:56 EDT

## 2021-08-27 ENCOUNTER — OFFICE VISIT (OUTPATIENT)
Dept: INTERNAL MEDICINE | Facility: CLINIC | Age: 41
End: 2021-08-27

## 2021-08-27 ENCOUNTER — TELEPHONE (OUTPATIENT)
Dept: INTERNAL MEDICINE | Facility: CLINIC | Age: 41
End: 2021-08-27

## 2021-08-27 ENCOUNTER — LAB (OUTPATIENT)
Dept: LAB | Facility: HOSPITAL | Age: 41
End: 2021-08-27

## 2021-08-27 VITALS
OXYGEN SATURATION: 100 % | HEART RATE: 63 BPM | WEIGHT: 140.2 LBS | BODY MASS INDEX: 23.93 KG/M2 | HEIGHT: 64 IN | DIASTOLIC BLOOD PRESSURE: 80 MMHG | SYSTOLIC BLOOD PRESSURE: 110 MMHG | TEMPERATURE: 97.8 F

## 2021-08-27 DIAGNOSIS — J45.40 MODERATE PERSISTENT ASTHMA WITHOUT COMPLICATION: Primary | ICD-10-CM

## 2021-08-27 DIAGNOSIS — J30.89 NON-SEASONAL ALLERGIC RHINITIS, UNSPECIFIED TRIGGER: ICD-10-CM

## 2021-08-27 DIAGNOSIS — J02.9 PHARYNGITIS, UNSPECIFIED ETIOLOGY: ICD-10-CM

## 2021-08-27 PROCEDURE — 99214 OFFICE O/P EST MOD 30 MIN: CPT | Performed by: PHYSICIAN ASSISTANT

## 2021-08-27 PROCEDURE — U0004 COV-19 TEST NON-CDC HGH THRU: HCPCS | Performed by: PHYSICIAN ASSISTANT

## 2021-08-27 RX ORDER — AMOXICILLIN AND CLAVULANATE POTASSIUM 875; 125 MG/1; MG/1
1 TABLET, FILM COATED ORAL 2 TIMES DAILY
Qty: 20 TABLET | Refills: 0 | Status: SHIPPED | OUTPATIENT
Start: 2021-08-27 | End: 2021-09-06

## 2021-08-27 RX ORDER — PREDNISONE 10 MG/1
TABLET ORAL
Qty: 30 TABLET | Refills: 0 | Status: SHIPPED | OUTPATIENT
Start: 2021-08-27 | End: 2022-07-13

## 2021-08-27 NOTE — PROGRESS NOTES
Patient Care Team:  Curry Gill MD as PCP - General (Internal Medicine)    Chief Complaint::   Chief Complaint   Patient presents with   • Cough        Subjective     HPI  Alicia is a 40 year old female with history of allergic rhinitis, asthma, and anxiety depression, who presents for evaluation of sore throat, cough, and congestion.  Reports her young daughter recently tested positive for strep throat.  Patient has history of asthma, currently taking Advair 500-50 and Spiriva daily.  Has had both Covid vaccinations.  Denies body aches, fever.          The following portions of the patient's history were reviewed and updated as appropriate: active problem list, medication list, allergies, family history, social history    Review of Systems:   Review of Systems   Constitutional: Negative for activity change, appetite change, diaphoresis, fatigue, fever, unexpected weight gain and unexpected weight loss.   HENT: Positive for congestion and sore throat. Negative for hearing loss.    Eyes: Negative for blurred vision, double vision and visual disturbance.   Respiratory: Positive for cough. Negative for chest tightness, shortness of breath and wheezing.    Cardiovascular: Negative for chest pain, palpitations and leg swelling.   Gastrointestinal: Negative for abdominal pain, blood in stool, GERD and indigestion.   Endocrine: Negative for cold intolerance and heat intolerance.   Genitourinary: Negative for dysuria and hematuria.   Musculoskeletal: Negative for arthralgias and myalgias.   Skin: Negative for skin lesions.   Allergic/Immunologic: Positive for environmental allergies.   Neurological: Negative for tremors, seizures, syncope, speech difficulty, weakness, headache, memory problem and confusion.   Hematological: Does not bruise/bleed easily.   Psychiatric/Behavioral: Negative for sleep disturbance and depressed mood. The patient is not nervous/anxious.        Vital Signs  Vitals:    08/27/21 1115   BP:  "110/80   BP Location: Left arm   Patient Position: Sitting   Cuff Size: Adult   Pulse: 63   Temp: 97.8 °F (36.6 °C)   TempSrc: Temporal   SpO2: 100%   Weight: 63.6 kg (140 lb 3.2 oz)   Height: 162.6 cm (64.02\")   PainSc: 0-No pain     Body mass index is 24.05 kg/m².    Labs  No visits with results within 3 Month(s) from this visit.   Latest known visit with results is:   Admission on 02/05/2018, Discharged on 02/07/2018   Component Date Value Ref Range Status   • External Strep Group B Ag 01/15/2018 Negative   Final   • WBC 02/05/2018 6.68  3.50 - 10.80 10*3/mm3 Final   • RBC 02/05/2018 4.08  3.89 - 5.14 10*6/mm3 Final   • Hemoglobin 02/05/2018 12.2  11.5 - 15.5 g/dL Final   • Hematocrit 02/05/2018 36.1  34.5 - 44.0 % Final   • MCV 02/05/2018 88.5  80.0 - 99.0 fL Final   • MCH 02/05/2018 29.9  27.0 - 31.0 pg Final   • MCHC 02/05/2018 33.8  32.0 - 36.0 g/dL Final   • RDW 02/05/2018 13.7  11.3 - 14.5 % Final   • RDW-SD 02/05/2018 44.7  37.0 - 54.0 fl Final   • MPV 02/05/2018 11.5  6.0 - 12.0 fL Final   • Platelets 02/05/2018 122* 150 - 450 10*3/mm3 Final   • ABO Type 02/05/2018 O   Final   • RH type 02/05/2018 Positive   Final   • Antibody Screen 02/05/2018 Negative   Final   • Site 02/05/2018 Cord Venous   Final   • pH, Cord Venous 02/05/2018 7.259  7.190 - 7.460 pH Units Final   • pCO2, Cord Venous 02/05/2018 48.0  30.0 - 60.0 mm Hg Final   • pO2, Cord Venous 02/05/2018 20.7  mm Hg Final   • HCO3, Cord Venous 02/05/2018 21.5* 18.6 - 21.4 mmol/L Final   • Base Excess, Cord Venous 02/05/2018 -6.0* 0.0 - 2.0 mmol/L Final   • O2 Sat, Cord Venous 02/05/2018 38.7  % Final   • Hemoglobin, Blood Gas 02/05/2018 16.9  14 - 18 g/dL Final   • CO2 Content 02/05/2018 22.9  22 - 33 Final   • Barometric Pressure for Blood Gas 02/05/2018   mmHg Final    N/A   • Modality 02/05/2018 N/A   Final   • FIO2 02/05/2018 21  % Final   • O2 Saturation Calculated 02/05/2018   45.0 - 75.0 % Final    Calculated O2 saturation result not reported " at this site.   • pH, Cord Arterial 02/05/2018 7.14* 7.20 - 7.30 pH Units Final   • pCO2, Cord Arterial 02/05/2018 71.0* 43.3 - 54.9 mmHg Final   • pO2, Cord Arterial 02/05/2018 11.4* 11.5 - 43.3 mmHg Final   • HCO3, Cord Arterial 02/05/2018 24.4* 16.9 - 20.5 mmol/L Final   • Base Exc, Cord Arterial 02/05/2018 -6.6* 0.0 - 2.0 mmol/L Final   • O2 Sat, Cord Arterial 02/05/2018 11.2  % Final   • Hemoglobin, Blood Gas 02/05/2018 16.9  14 - 18 g/dL Final   • CO2 Content 02/05/2018 26.5  22 - 33 Final   • Barometric Pressure for Blood Gas 02/05/2018   mmHg Final    N/A   • Modality 02/05/2018 N/A   Final   • FIO2 02/05/2018 21  % Final   • WBC 02/06/2018 9.58  3.50 - 10.80 10*3/mm3 Final   • RBC 02/06/2018 3.54* 3.89 - 5.14 10*6/mm3 Final   • Hemoglobin 02/06/2018 10.4* 11.5 - 15.5 g/dL Final   • Hematocrit 02/06/2018 31.6* 34.5 - 44.0 % Final   • MCV 02/06/2018 89.3  80.0 - 99.0 fL Final   • MCH 02/06/2018 29.4  27.0 - 31.0 pg Final   • MCHC 02/06/2018 32.9  32.0 - 36.0 g/dL Final   • RDW 02/06/2018 14.2  11.3 - 14.5 % Final   • RDW-SD 02/06/2018 46.0  37.0 - 54.0 fl Final   • MPV 02/06/2018 11.3  6.0 - 12.0 fL Final   • Platelets 02/06/2018 117* 150 - 450 10*3/mm3 Final   • Neutrophil % 02/06/2018 82.6* 41.0 - 71.0 % Final   • Lymphocyte % 02/06/2018 10.0* 24.0 - 44.0 % Final   • Monocyte % 02/06/2018 6.9  0.0 - 12.0 % Final   • Eosinophil % 02/06/2018 0.1  0.0 - 3.0 % Final   • Basophil % 02/06/2018 0.1  0.0 - 1.0 % Final   • Immature Grans % 02/06/2018 0.3  0.0 - 0.6 % Final   • Neutrophils, Absolute 02/06/2018 7.91  1.50 - 8.30 10*3/mm3 Final   • Lymphocytes, Absolute 02/06/2018 0.96  0.60 - 4.80 10*3/mm3 Final   • Monocytes, Absolute 02/06/2018 0.66  0.00 - 1.00 10*3/mm3 Final   • Eosinophils, Absolute 02/06/2018 0.01  0.00 - 0.30 10*3/mm3 Final   • Basophils, Absolute 02/06/2018 0.01  0.00 - 0.20 10*3/mm3 Final   • Immature Grans, Absolute 02/06/2018 0.03  0.00 - 0.03 10*3/mm3 Final       Imaging  No radiology  results for the last 30 days.      Current Outpatient Medications:   •  albuterol (PROVENTIL) (2.5 MG/3ML) 0.083% nebulizer solution, Take 2.5 mg by nebulization Every 4 (Four) Hours As Needed for Wheezing., Disp: , Rfl:   •  ALBUTEROL SULFATE  (90 Base) MCG/ACT inhaler, INHALE 2 PUFFS BY MOUTH EVERY 4 HOURS AS NEEDED FOR WHEEZING, Disp: 8.5 g, Rfl: 5  •  ALPRAZolam (XANAX) 0.5 MG tablet, Take 1 tablet by mouth 2 (Two) Times a Day As Needed for Anxiety. for anxiety, Disp: 30 tablet, Rfl: 2  •  cyclobenzaprine (FLEXERIL) 10 MG tablet, TAKE 1 TABLET BY MOUTH AT NIGHT AS NEEDED FOR MUSCLE SPASMS, Disp: 30 tablet, Rfl: 2  •  escitalopram (LEXAPRO) 10 MG tablet, Take 1 tablet by mouth Daily., Disp: 30 tablet, Rfl: 5  •  fluticasone-salmeterol (Advair Diskus) 500-50 MCG/DOSE DISKUS, Inhale 1 puff 2 (Two) Times a Day., Disp: 60 each, Rfl: 5  •  loratadine (CLARITIN) 10 MG tablet, Take 1 tablet by mouth Daily., Disp: , Rfl:   •  Spiriva Respimat 1.25 MCG/ACT aerosol solution inhaler, INHALE 2 PUFFS BY MOUTH DAILY, Disp: 4 g, Rfl: 1  •  amoxicillin-clavulanate (Augmentin) 875-125 MG per tablet, Take 1 tablet by mouth 2 (Two) Times a Day for 10 days., Disp: 20 tablet, Rfl: 0  •  predniSONE (DELTASONE) 10 MG tablet, Take 4 tabs x 3 days, then 3 tabs x 3 days, then 2 tabs x 3 days, then 1 tab x 3 days, Disp: 30 tablet, Rfl: 0    Physical Exam:    Physical Exam  Vitals reviewed.   Constitutional:       Appearance: Normal appearance. She is well-developed.   HENT:      Head: Normocephalic and atraumatic.      Right Ear: Hearing, tympanic membrane, ear canal and external ear normal.      Left Ear: Hearing, tympanic membrane, ear canal and external ear normal.      Nose: Nose normal.      Mouth/Throat:      Mouth: Mucous membranes are moist.      Pharynx: Oropharynx is clear. Uvula midline. Posterior oropharyngeal erythema present.   Eyes:      General: Lids are normal.      Conjunctiva/sclera: Conjunctivae normal.       Pupils: Pupils are equal, round, and reactive to light.   Cardiovascular:      Rate and Rhythm: Normal rate and regular rhythm.      Heart sounds: Normal heart sounds.   Pulmonary:      Effort: Pulmonary effort is normal. No respiratory distress.      Breath sounds: Normal breath sounds. No wheezing.   Abdominal:      General: Bowel sounds are normal.      Palpations: Abdomen is soft.   Musculoskeletal:         General: Normal range of motion.      Cervical back: Full passive range of motion without pain, normal range of motion and neck supple.   Skin:     General: Skin is warm and dry.   Neurological:      Mental Status: She is alert and oriented to person, place, and time.      Deep Tendon Reflexes: Reflexes are normal and symmetric.   Psychiatric:         Speech: Speech normal.         Behavior: Behavior normal.         Thought Content: Thought content normal.         Judgment: Judgment normal.         Procedures        Assessment/Plan   Problem List Items Addressed This Visit        Allergies and Adverse Reactions    Allergic rhinitis    Overview     Continue loratadine 10 mg tablets daily.         Relevant Medications    predniSONE (DELTASONE) 10 MG tablet       ENT    Pharyngitis    Overview     Strep negative.  Covid swab obtained.  Due to Friday appointment, patient given Augmentin in case sore throat and congestion worsens.  Prednisone taper if chest tightness and coughing worsen.         Relevant Medications    amoxicillin-clavulanate (Augmentin) 875-125 MG per tablet    Other Relevant Orders    COVID-19 PCR, [facility], NP SWAB IN [facility]/ORAL SWISH 24-30 HR TAT - Swab, Nasopharynx       Pulmonary and Pneumonias    Asthma - Primary    Overview     Continue Advair Diskus 500-50 twice daily, Spiriva, and loratadine as directed.         Relevant Medications    albuterol (PROVENTIL) (2.5 MG/3ML) 0.083% nebulizer solution    ALBUTEROL SULFATE  (90 Base) MCG/ACT inhaler    Spiriva  Respimat 1.25 MCG/ACT aerosol solution inhaler    amoxicillin-clavulanate (Augmentin) 875-125 MG per tablet    predniSONE (DELTASONE) 10 MG tablet    fluticasone-salmeterol (Advair Diskus) 500-50 MCG/DOSE DISKUS          Return if symptoms worsen or fail to improve.    Plan of care reviewed with patient at the conclusion of today's visit. Education was provided regarding diagnosis, management, and any prescribed or recommended OTC medications.Patient verbalizes understanding of and agreement with management plan.     I spent 38 minutes caring for Alicia on this date of service. This time includes time spent by me in the following activities:reviewing tests, obtaining and/or reviewing a separately obtained history, performing a medically appropriate examination and/or evaluation , counseling and educating the patient/family/caregiver and ordering medications, tests, or procedures    Silvia Pryor PA-C    Please note that portions of this note were completed with a voice recognition program. Efforts were made to edit the dictations, but occasionally words are mistranscribed.

## 2021-08-28 LAB — SARS-COV-2 RNA NOSE QL NAA+PROBE: NOT DETECTED

## 2021-12-02 DIAGNOSIS — J45.41 MODERATE PERSISTENT ASTHMATIC BRONCHITIS WITH ACUTE EXACERBATION: ICD-10-CM

## 2021-12-02 RX ORDER — ALBUTEROL SULFATE 90 UG/1
AEROSOL, METERED RESPIRATORY (INHALATION)
Qty: 8.5 G | Refills: 5 | Status: SHIPPED | OUTPATIENT
Start: 2021-12-02 | End: 2022-10-04

## 2021-12-02 NOTE — TELEPHONE ENCOUNTER
Rx Refill Note  Requested Prescriptions     Pending Prescriptions Disp Refills   • albuterol sulfate  (90 Base) MCG/ACT inhaler [Pharmacy Med Name: ALBUTEROL HFA INH (200 PUFFS)8.5GM] 8.5 g 5     Sig: INHALE 2 PUFFS BY MOUTH EVERY 4 HOURS AS NEEDED FOR WHEEZING      Last office visit with prescribing clinician: 7/15/2021      Next office visit with prescribing clinician: 7/19/2022            Lila Mcgill LPN  12/02/21, 08:21 EST

## 2022-02-09 DIAGNOSIS — F41.9 ANXIETY: ICD-10-CM

## 2022-02-10 RX ORDER — ALPRAZOLAM 0.5 MG/1
TABLET ORAL
Qty: 30 TABLET | Refills: 2 | Status: SHIPPED | OUTPATIENT
Start: 2022-02-10 | End: 2022-10-05

## 2022-02-10 NOTE — TELEPHONE ENCOUNTER
Rx Refill Note  Requested Prescriptions     Pending Prescriptions Disp Refills   • ALPRAZolam (XANAX) 0.5 MG tablet [Pharmacy Med Name: ALPRAZOLAM 0.5MG TABLETS] 30 tablet      Sig: TAKE 1 TABLET BY MOUTH TWICE DAILY AS NEEDED FOR ANXIETY      Last office visit with prescribing clinician: 08/27/21  Next office visit with prescribing clinician: 7/19/2022     LA: 07/15/21 #30 2R             Toyin Ortega LPN  02/10/22, 09:10 EST

## 2022-03-07 RX ORDER — AZITHROMYCIN 250 MG/1
TABLET, FILM COATED ORAL
Qty: 6 TABLET | Refills: 0 | Status: SHIPPED | OUTPATIENT
Start: 2022-03-07 | End: 2022-07-13

## 2022-06-02 RX ORDER — FLUTICASONE PROPIONATE AND SALMETEROL 50; 500 UG/1; UG/1
POWDER RESPIRATORY (INHALATION)
Qty: 60 EACH | Refills: 5 | Status: SHIPPED | OUTPATIENT
Start: 2022-06-02 | End: 2022-06-30 | Stop reason: SDUPTHER

## 2022-06-02 NOTE — TELEPHONE ENCOUNTER
Rx Refill Note  Requested Prescriptions     Pending Prescriptions Disp Refills   • Advair Diskus 500-50 MCG/ACT DISKUS [Pharmacy Med Name: ADVAIR DISKUS 500/50MCG (RED) 60S] 60 each 5     Sig: INHALE 1 PUFF BY MOUTH TWICE DAILY      Last office visit with prescribing clinician: 7/15/2021      Next office visit with prescribing clinician: 7/19/2022            Lila Mcgill LPN  06/02/22, 07:43 EDT

## 2022-06-30 RX ORDER — FLUTICASONE PROPIONATE AND SALMETEROL 500; 50 UG/1; UG/1
1 POWDER RESPIRATORY (INHALATION) 2 TIMES DAILY
Qty: 60 EACH | Refills: 0 | Status: SHIPPED | OUTPATIENT
Start: 2022-06-30 | End: 2022-10-04 | Stop reason: SDUPTHER

## 2022-07-01 ENCOUNTER — TRANSCRIBE ORDERS (OUTPATIENT)
Dept: ADMINISTRATIVE | Facility: HOSPITAL | Age: 42
End: 2022-07-01

## 2022-07-01 DIAGNOSIS — Z12.39 ENCOUNTER FOR SCREENING FOR MALIGNANT NEOPLASM OF BREAST, UNSPECIFIED SCREENING MODALITY: Primary | ICD-10-CM

## 2022-07-13 ENCOUNTER — OFFICE VISIT (OUTPATIENT)
Dept: INTERNAL MEDICINE | Facility: CLINIC | Age: 42
End: 2022-07-13

## 2022-07-13 ENCOUNTER — TELEPHONE (OUTPATIENT)
Dept: INTERNAL MEDICINE | Facility: CLINIC | Age: 42
End: 2022-07-13

## 2022-07-13 VITALS
WEIGHT: 152 LBS | DIASTOLIC BLOOD PRESSURE: 80 MMHG | BODY MASS INDEX: 25.95 KG/M2 | HEART RATE: 64 BPM | HEIGHT: 64 IN | SYSTOLIC BLOOD PRESSURE: 112 MMHG

## 2022-07-13 DIAGNOSIS — L50.9 URTICARIA: Primary | ICD-10-CM

## 2022-07-13 PROCEDURE — 96372 THER/PROPH/DIAG INJ SC/IM: CPT | Performed by: INTERNAL MEDICINE

## 2022-07-13 PROCEDURE — 99213 OFFICE O/P EST LOW 20 MIN: CPT | Performed by: INTERNAL MEDICINE

## 2022-07-13 RX ORDER — TRIAMCINOLONE ACETONIDE 40 MG/ML
80 INJECTION, SUSPENSION INTRA-ARTICULAR; INTRAMUSCULAR ONCE
Status: COMPLETED | OUTPATIENT
Start: 2022-07-13 | End: 2022-07-13

## 2022-07-13 RX ORDER — PREDNISONE 10 MG/1
TABLET ORAL
Qty: 30 TABLET | Refills: 0 | Status: SHIPPED | OUTPATIENT
Start: 2022-07-13 | End: 2022-08-15

## 2022-07-13 RX ADMIN — TRIAMCINOLONE ACETONIDE 80 MG: 40 INJECTION, SUSPENSION INTRA-ARTICULAR; INTRAMUSCULAR at 15:49

## 2022-07-13 NOTE — TELEPHONE ENCOUNTER
Caller: Alicia Diaz    Relationship: Self    Best call back number: [phone]    What is the best time to reach you: ANYTIME    Who are you requesting to speak with (clinical staff, provider,  specific staff member): CLINICAL STAFF    Do you know the name of the person who called: SELF    What was the call regarding: PATIENT STATES THAT SHE WOULD LIKE A CALL ABOUT 35 BUG BITES THAT SHE HAS.    Do you require a callback: YES

## 2022-07-13 NOTE — PROGRESS NOTES
"[facility]     Alicia Diaz  1980   [id number]      Patient Care Team:  Curry Gill MD as PCP - General (Internal Medicine)    Chief Complaint::   Chief Complaint   Patient presents with   • Insect Bite     Arms, legs.  Multiple bites.  Believes happened in [place].  Itching        HPI    Insect bites  The patient reports that she has insect bites on her upper and lower extremities. She states that they are everywhere and they itch like \"crazy.\" She notes that these used to not be welts; they were more like a bunch of little dots. She adds that she got them in [place] and they got back on Saturday. Last night she was outside for a little bit, and then she woke up at 2:00 AM and they were everywhere. She has been itching for 24 hours now.     Chronic Conditions:      Patient Active Problem List   Diagnosis   • S/P emergency  section   • Asthma   • Anxiety   • Insomnia   • Irritable bowel syndrome   • Acute bacterial rhinosinusitis   • Allergic rhinitis   • Depression   • Pharyngitis        Past Medical History:   Diagnosis Date   • Allergy    • Asthma    • Asthma    • Family history of asthma    • Irritable bowel syndrome        Past Surgical History:   Procedure Laterality Date   •  SECTION Bilateral 2018    Procedure:  SECTION PRIMARY;  Surgeon: Mariam Kim DO;  [facility];  Service:    • WISDOM TOOTH EXTRACTION         Family History   Problem Relation Age of Onset   • Diabetes Paternal Grandmother    • Asthma Other        Social History     Socioeconomic History   • Marital status:    Tobacco Use   • Smoking status: Never Smoker   • Smokeless tobacco: Never Used   Vaping Use   • Vaping Use: Never used   Substance and Sexual Activity   • Alcohol use: No   • Drug use: No   • Sexual activity: Yes     Partners: Male       Allergies   Allergen Reactions   • Bactrim [Sulfamethoxazole-Trimethoprim] Hives, Rash and GI Intolerance " "        Current Outpatient Medications:   •  albuterol (PROVENTIL) (2.5 MG/3ML) 0.083% nebulizer solution, Take 2.5 mg by nebulization Every 4 (Four) Hours As Needed for Wheezing., Disp: , Rfl:   •  albuterol sulfate  (90 Base) MCG/ACT inhaler, INHALE 2 PUFFS BY MOUTH EVERY 4 HOURS AS NEEDED FOR WHEEZING, Disp: 8.5 g, Rfl: 5  •  ALPRAZolam (XANAX) 0.5 MG tablet, TAKE 1 TABLET BY MOUTH TWICE DAILY AS NEEDED FOR ANXIETY, Disp: 30 tablet, Rfl: 2  •  cyclobenzaprine (FLEXERIL) 10 MG tablet, TAKE 1 TABLET BY MOUTH AT NIGHT AS NEEDED FOR MUSCLE SPASMS, Disp: 30 tablet, Rfl: 2  •  escitalopram (LEXAPRO) 10 MG tablet, Take 1 tablet by mouth Daily., Disp: 30 tablet, Rfl: 5  •  Fluticasone-Salmeterol (Advair Diskus) 500-50 MCG/ACT DISKUS, Inhale 1 puff 2 (Two) Times a Day., Disp: 60 each, Rfl: 0  •  loratadine (CLARITIN) 10 MG tablet, Take 1 tablet by mouth Daily., Disp: , Rfl:   •  Spiriva Respimat 1.25 MCG/ACT aerosol solution inhaler, INHALE 2 PUFFS BY MOUTH DAILY, Disp: 4 g, Rfl: 1  •  predniSONE (DELTASONE) 10 MG tablet, Take 4 tablets a day for 3 days, then 3 for 3 days, 2 for 3 days, then 1 tablet a day for 3 days., Disp: 30 tablet, Rfl: 0    Review of Systems   A review of systems was performed, and the pertinent positives are noted in the HPI.    Vital Signs  Vitals:    07/13/22 1504   BP: 112/80   BP Location: Right arm   Patient Position: Sitting   Cuff Size: Adult   Pulse: 64   Weight: 68.9 kg (152 lb)   Height: 162.6 cm (64\")   PainSc: 0-No pain       Physical Exam  Vitals reviewed.   Constitutional:       Appearance: She is well-developed.   HENT:      Head: Normocephalic and atraumatic.   Cardiovascular:      Rate and Rhythm: Normal rate and regular rhythm.      Heart sounds: Normal heart sounds. No murmur heard.  Pulmonary:      Effort: Pulmonary effort is normal.      Breath sounds: Normal breath sounds.   Skin:     Findings: Lesion present.      Comments: She has urticarial lesions diffusely over both " lower extremities and a few on the upper extremities.     Neurological:      Mental Status: She is alert and oriented to person, place, and time.          Procedures    ACE III MINI             Assessment/Plan:    Diagnoses and all orders for this visit:    1. Urticaria (Primary)  -     triamcinolone acetonide (KENALOG-40) injection 80 mg    Other orders  -     predniSONE (DELTASONE) 10 MG tablet; Take 4 tablets a day for 3 days, then 3 for 3 days, 2 for 3 days, then 1 tablet a day for 3 days.  Dispense: 30 tablet; Refill: 0      1. Urticaria  - This began as insect bites, but now she has had a systemic response. She is given parenteral triamcinolone and a tapering prednisone course.    Follow up as previously scheduled.      Plan of care reviewed with patient at the conclusion of today's visit. Education was provided regarding diagnosis, management, and any prescribed or recommended OTC medications.Patient verbalizes understanding of and agreement with management plan.         Curry Gill MD               Transcribed from ambient dictation for Curry Gill MD by Dian Marcano.  07/13/22   15:55 EDT    Patient verbalized consent to the visit recording.

## 2022-07-13 NOTE — TELEPHONE ENCOUNTER
Called patient she stated she went to [place] last week and got some bug bites. She states now she has whelps everywhere and non stop itching . I scheduled her an appt. Today at 2:45

## 2022-07-18 RX ORDER — ESCITALOPRAM OXALATE 10 MG/1
10 TABLET ORAL DAILY
Qty: 30 TABLET | Refills: 5 | Status: SHIPPED | OUTPATIENT
Start: 2022-07-18 | End: 2022-10-04 | Stop reason: SDUPTHER

## 2022-08-03 ENCOUNTER — HOSPITAL ENCOUNTER (OUTPATIENT)
Dept: MAMMOGRAPHY | Facility: HOSPITAL | Age: 42
Discharge: HOME | End: 2022-08-03

## 2022-08-03 ENCOUNTER — HOSPITAL ENCOUNTER (OUTPATIENT)
Dept: ULTRASOUND IMAGING | Facility: HOSPITAL | Age: 42
Discharge: HOME | End: 2022-08-03

## 2022-08-03 DIAGNOSIS — Z12.39 ENCOUNTER FOR SCREENING FOR MALIGNANT NEOPLASM OF BREAST, UNSPECIFIED SCREENING MODALITY: ICD-10-CM

## 2022-08-03 PROCEDURE — 77066 DX MAMMO INCL CAD BI: CPT

## 2022-08-03 PROCEDURE — 77062 BREAST TOMOSYNTHESIS BI: CPT | Performed by: RADIOLOGY

## 2022-08-03 PROCEDURE — 76642 ULTRASOUND BREAST LIMITED: CPT

## 2022-08-03 PROCEDURE — 77066 DX MAMMO INCL CAD BI: CPT | Performed by: RADIOLOGY

## 2022-08-03 PROCEDURE — G0279 TOMOSYNTHESIS, MAMMO: HCPCS

## 2022-08-03 PROCEDURE — 76642 ULTRASOUND BREAST LIMITED: CPT | Performed by: RADIOLOGY

## 2022-08-15 ENCOUNTER — PRE-ADMISSION TESTING (OUTPATIENT)
Dept: PREADMISSION TESTING | Facility: HOSPITAL | Age: 42
End: 2022-08-15

## 2022-08-15 VITALS — WEIGHT: 148.59 LBS | BODY MASS INDEX: 25.37 KG/M2 | HEIGHT: 64 IN

## 2022-08-15 LAB
ALBUMIN SERPL-MCNC: 4.5 G/DL (ref 3.5–5.2)
ALBUMIN/GLOB SERPL: 2 G/DL
ALP SERPL-CCNC: 49 U/L (ref 39–117)
ALT SERPL W P-5'-P-CCNC: 18 U/L (ref 1–33)
ANION GAP SERPL CALCULATED.3IONS-SCNC: 9 MMOL/L (ref 5–15)
AST SERPL-CCNC: 20 U/L (ref 1–32)
BILIRUB SERPL-MCNC: 0.7 MG/DL (ref 0–1.2)
BUN SERPL-MCNC: 13 MG/DL (ref 6–20)
BUN/CREAT SERPL: 18.8 (ref 7–25)
CALCIUM SPEC-SCNC: 8.9 MG/DL (ref 8.6–10.5)
CHLORIDE SERPL-SCNC: 101 MMOL/L (ref 98–107)
CO2 SERPL-SCNC: 25 MMOL/L (ref 22–29)
CREAT SERPL-MCNC: 0.69 MG/DL (ref 0.57–1)
DEPRECATED RDW RBC AUTO: 42.5 FL (ref 37–54)
EGFRCR SERPLBLD CKD-EPI 2021: 113.4 ML/MIN/1.73
ERYTHROCYTE [DISTWIDTH] IN BLOOD BY AUTOMATED COUNT: 12.2 % (ref 12.3–15.4)
GLOBULIN UR ELPH-MCNC: 2.2 GM/DL
GLUCOSE SERPL-MCNC: 95 MG/DL (ref 65–99)
HCT VFR BLD AUTO: 38.6 % (ref 34–46.6)
HGB BLD-MCNC: 13 G/DL (ref 12–15.9)
MCH RBC QN AUTO: 32.2 PG (ref 26.6–33)
MCHC RBC AUTO-ENTMCNC: 33.7 G/DL (ref 31.5–35.7)
MCV RBC AUTO: 95.5 FL (ref 79–97)
PLATELET # BLD AUTO: 148 10*3/MM3 (ref 140–450)
PMV BLD AUTO: 9.3 FL (ref 6–12)
POTASSIUM SERPL-SCNC: 3.9 MMOL/L (ref 3.5–5.2)
PROT SERPL-MCNC: 6.7 G/DL (ref 6–8.5)
QT INTERVAL: 436 MS
QTC INTERVAL: 424 MS
RBC # BLD AUTO: 4.04 10*6/MM3 (ref 3.77–5.28)
SODIUM SERPL-SCNC: 135 MMOL/L (ref 136–145)
WBC NRBC COR # BLD: 2.67 10*3/MM3 (ref 3.4–10.8)

## 2022-08-15 PROCEDURE — 85027 COMPLETE CBC AUTOMATED: CPT

## 2022-08-15 PROCEDURE — 93010 ELECTROCARDIOGRAM REPORT: CPT | Performed by: INTERNAL MEDICINE

## 2022-08-15 PROCEDURE — 80053 COMPREHEN METABOLIC PANEL: CPT

## 2022-08-15 PROCEDURE — 36415 COLL VENOUS BLD VENIPUNCTURE: CPT

## 2022-08-15 PROCEDURE — 93005 ELECTROCARDIOGRAM TRACING: CPT

## 2022-08-15 NOTE — PAT
Patient denies any current skin issues.     Patient instructed to drink 20 ounces (or until full) of Gatorade and it needs to be completed 1 hour (for Main OR patients) or 2 hours (scheduled  section patients) before given arrival time for procedure (NO RED Gatorade)    Patient verbalized understanding.     It was noted during Pre Admission Testing that patient was wearing some form of fingernail polish (gel/regular) and/or acrylic/artificial nails.  Patient was told that polish and/or artificial nails must be removed for surgery.  If a patient had recent manicure, and would rather not remove polish or artificial nails. Then the minimum requirement is that the polish/artificial nails must be removed from the middle finger on each hand.  Patient verbalized understanding.    If patient was having surgery on an upper extremity, then the patient was instructed that fingernail polish/artificial fingernails must be removed for surgery.  NO EXCEPTIONS.  Patient verbalized understanding.  Patient to apply Chlorhexadine wipes  to surgical area (as instructed) the night before procedure and the AM of procedure. Wipes provided..

## 2022-08-15 NOTE — DISCHARGE INSTRUCTIONS
Patient instructed to drink 20 ounces (or until full) of Gatorade and it needs to be completed 1 hour (for Main OR patients) or 2 hours (scheduled  section patients) before given arrival time for procedure (NO RED Gatorade)    Patient verbalized understanding.     It was noted during Pre Admission Testing that patient was wearing some form of fingernail polish (gel/regular) and/or acrylic/artificial nails.  Patient was told that polish and/or artificial nails must be removed for surgery.  If a patient had recent manicure, and would rather not remove polish or artificial nails. Then the minimum requirement is that the polish/artificial nails must be removed from the middle finger on each hand.  Patient verbalized understanding.    If patient was having surgery on an upper extremity, then the patient was instructed that fingernail polish/artificial fingernails must be removed for surgery.  NO EXCEPTIONS.  Patient verbalized understanding.  Patient to apply Chlorhexadine wipes  to surgical area (as instructed) the night before procedure and the AM of procedure. Wipes provided..    Patient viewed general PAT education video as instructed in their preoperative information received from their surgeon.  Patient stated the general PAT education video was viewed in its entirety and survey completed.  Copies of [facility] general education handouts (Incentive Spirometry, Meds to Beds Program, Patient Belongings, Pre-op skin preparation instructions, Blood Glucose testing, Visitor policy, Surgery FAQ, Code H) distributed to patient if not printed. Education related to the PAT pass and skin preparation for surgery (if applicable) completed in PAT as a reinforcement to PAT education video. Patient instructed to return PAT pass provided today as well as completed skin preparation sheet (if applicable) on the day of procedure.     Additionally if patient had not viewed video yet but intended to view it at home or in our waiting  area, then referred them to the handout with QR code/link provided during PAT visit.  Instructed patient to complete survey after viewing the video in its entirety.  Encouraged patient/family to read PAT general education handouts thoroughly and notify PAT staff with any questions or concerns. Patient verbalized understanding of all information and priority content.

## 2022-08-19 ENCOUNTER — APPOINTMENT (OUTPATIENT)
Dept: PREADMISSION TESTING | Facility: HOSPITAL | Age: 42
End: 2022-08-19

## 2022-08-20 ENCOUNTER — ANESTHESIA EVENT (OUTPATIENT)
Dept: PERIOP | Facility: HOSPITAL | Age: 42
End: 2022-08-20

## 2022-08-20 RX ORDER — FAMOTIDINE 10 MG/ML
20 INJECTION, SOLUTION INTRAVENOUS ONCE
Status: CANCELLED | OUTPATIENT
Start: 2022-08-20 | End: 2022-08-20

## 2022-08-22 ENCOUNTER — ANESTHESIA (OUTPATIENT)
Dept: PERIOP | Facility: HOSPITAL | Age: 42
End: 2022-08-22

## 2022-08-22 ENCOUNTER — HOSPITAL ENCOUNTER (OUTPATIENT)
Facility: HOSPITAL | Age: 42
Setting detail: HOSPITAL OUTPATIENT SURGERY
Discharge: HOME | End: 2022-08-22
Attending: PLASTIC SURGERY | Admitting: PLASTIC SURGERY

## 2022-08-22 ENCOUNTER — ANESTHESIA EVENT CONVERTED (OUTPATIENT)
Dept: ANESTHESIOLOGY | Facility: HOSPITAL | Age: 42
End: 2022-08-22

## 2022-08-22 VITALS
SYSTOLIC BLOOD PRESSURE: 112 MMHG | RESPIRATION RATE: 16 BRPM | OXYGEN SATURATION: 95 % | HEART RATE: 84 BPM | DIASTOLIC BLOOD PRESSURE: 75 MMHG | TEMPERATURE: 97.5 F

## 2022-08-22 DIAGNOSIS — N62 MACROMASTIA: ICD-10-CM

## 2022-08-22 LAB
B-HCG UR QL: NEGATIVE
DEPRECATED RDW RBC AUTO: 42.8 FL (ref 37–54)
ERYTHROCYTE [DISTWIDTH] IN BLOOD BY AUTOMATED COUNT: 12.3 % (ref 12.3–15.4)
EXPIRATION DATE: NORMAL
HCT VFR BLD AUTO: 39.2 % (ref 34–46.6)
HGB BLD-MCNC: 13.2 G/DL (ref 12–15.9)
INTERNAL NEGATIVE CONTROL: NEGATIVE
INTERNAL POSITIVE CONTROL: POSITIVE
Lab: NORMAL
MCH RBC QN AUTO: 31.7 PG (ref 26.6–33)
MCHC RBC AUTO-ENTMCNC: 33.7 G/DL (ref 31.5–35.7)
MCV RBC AUTO: 94.2 FL (ref 79–97)
PLATELET # BLD AUTO: 180 10*3/MM3 (ref 140–450)
PMV BLD AUTO: 9.7 FL (ref 6–12)
RBC # BLD AUTO: 4.16 10*6/MM3 (ref 3.77–5.28)
WBC NRBC COR # BLD: 3.69 10*3/MM3 (ref 3.4–10.8)

## 2022-08-22 PROCEDURE — 25010000002 HEPARIN (PORCINE) PER 1000 UNITS: Performed by: PLASTIC SURGERY

## 2022-08-22 PROCEDURE — 25010000002 CEFAZOLIN IN DEXTROSE 2-4 GM/100ML-% SOLUTION: Performed by: PLASTIC SURGERY

## 2022-08-22 PROCEDURE — 25010000002 FENTANYL CITRATE (PF) 50 MCG/ML SOLUTION

## 2022-08-22 PROCEDURE — 0 MEPERIDINE PER 100 MG

## 2022-08-22 PROCEDURE — 25010000002 ONDANSETRON PER 1 MG

## 2022-08-22 PROCEDURE — 88305 TISSUE EXAM BY PATHOLOGIST: CPT | Performed by: PLASTIC SURGERY

## 2022-08-22 PROCEDURE — 85027 COMPLETE CBC AUTOMATED: CPT | Performed by: PLASTIC SURGERY

## 2022-08-22 PROCEDURE — 81025 URINE PREGNANCY TEST: CPT | Performed by: PLASTIC SURGERY

## 2022-08-22 PROCEDURE — S0260 H&P FOR SURGERY: HCPCS | Performed by: PHYSICIAN ASSISTANT

## 2022-08-22 PROCEDURE — 25010000002 DROPERIDOL PER 5 MG

## 2022-08-22 PROCEDURE — 25010000002 DEXAMETHASONE PER 1 MG

## 2022-08-22 PROCEDURE — 25010000002 EPINEPHRINE PER 0.1 MG: Performed by: PLASTIC SURGERY

## 2022-08-22 PROCEDURE — 25010000002 MIDAZOLAM PER 1 MG

## 2022-08-22 PROCEDURE — 25010000002 PROPOFOL 10 MG/ML EMULSION

## 2022-08-22 DEVICE — DEV CONTRL TISS STRATAFIX SPIRAL MNCRYL UD 3/0 PLS 60CM: Type: IMPLANTABLE DEVICE | Site: BREAST | Status: FUNCTIONAL

## 2022-08-22 RX ORDER — ONDANSETRON 2 MG/ML
INJECTION INTRAMUSCULAR; INTRAVENOUS AS NEEDED
Status: DISCONTINUED | OUTPATIENT
Start: 2022-08-22 | End: 2022-08-22 | Stop reason: SURG

## 2022-08-22 RX ORDER — MAGNESIUM HYDROXIDE 1200 MG/15ML
LIQUID ORAL AS NEEDED
Status: DISCONTINUED | OUTPATIENT
Start: 2022-08-22 | End: 2022-08-22 | Stop reason: HOSPADM

## 2022-08-22 RX ORDER — SODIUM CHLORIDE 0.9 % (FLUSH) 0.9 %
10 SYRINGE (ML) INJECTION EVERY 12 HOURS SCHEDULED
Status: DISCONTINUED | OUTPATIENT
Start: 2022-08-22 | End: 2022-08-22 | Stop reason: HOSPADM

## 2022-08-22 RX ORDER — FAMOTIDINE 20 MG/1
20 TABLET, FILM COATED ORAL ONCE
Status: COMPLETED | OUTPATIENT
Start: 2022-08-22 | End: 2022-08-22

## 2022-08-22 RX ORDER — MEPERIDINE HYDROCHLORIDE 25 MG/ML
12.5 INJECTION INTRAMUSCULAR; INTRAVENOUS; SUBCUTANEOUS
Status: DISCONTINUED | OUTPATIENT
Start: 2022-08-22 | End: 2022-08-22 | Stop reason: HOSPADM

## 2022-08-22 RX ORDER — MEPERIDINE HYDROCHLORIDE 25 MG/ML
INJECTION INTRAMUSCULAR; INTRAVENOUS; SUBCUTANEOUS
Status: COMPLETED
Start: 2022-08-22 | End: 2022-08-22

## 2022-08-22 RX ORDER — LABETALOL HYDROCHLORIDE 5 MG/ML
5 INJECTION, SOLUTION INTRAVENOUS
Status: DISCONTINUED | OUTPATIENT
Start: 2022-08-22 | End: 2022-08-22 | Stop reason: HOSPADM

## 2022-08-22 RX ORDER — HYDROMORPHONE HYDROCHLORIDE 1 MG/ML
0.5 INJECTION, SOLUTION INTRAMUSCULAR; INTRAVENOUS; SUBCUTANEOUS
Status: DISCONTINUED | OUTPATIENT
Start: 2022-08-22 | End: 2022-08-22 | Stop reason: HOSPADM

## 2022-08-22 RX ORDER — LIDOCAINE HYDROCHLORIDE 10 MG/ML
0.5 INJECTION, SOLUTION EPIDURAL; INFILTRATION; INTRACAUDAL; PERINEURAL ONCE AS NEEDED
Status: COMPLETED | OUTPATIENT
Start: 2022-08-22 | End: 2022-08-22

## 2022-08-22 RX ORDER — DEXAMETHASONE SODIUM PHOSPHATE 4 MG/ML
INJECTION, SOLUTION INTRA-ARTICULAR; INTRALESIONAL; INTRAMUSCULAR; INTRAVENOUS; SOFT TISSUE AS NEEDED
Status: DISCONTINUED | OUTPATIENT
Start: 2022-08-22 | End: 2022-08-22 | Stop reason: SURG

## 2022-08-22 RX ORDER — DROPERIDOL 2.5 MG/ML
INJECTION, SOLUTION INTRAMUSCULAR; INTRAVENOUS
Status: COMPLETED
Start: 2022-08-22 | End: 2022-08-22

## 2022-08-22 RX ORDER — LIDOCAINE HYDROCHLORIDE 10 MG/ML
INJECTION, SOLUTION EPIDURAL; INFILTRATION; INTRACAUDAL; PERINEURAL AS NEEDED
Status: DISCONTINUED | OUTPATIENT
Start: 2022-08-22 | End: 2022-08-22 | Stop reason: SURG

## 2022-08-22 RX ORDER — PREGABALIN 75 MG/1
75 CAPSULE ORAL ONCE
Status: COMPLETED | OUTPATIENT
Start: 2022-08-22 | End: 2022-08-22

## 2022-08-22 RX ORDER — SODIUM CHLORIDE 0.9 % (FLUSH) 0.9 %
3-10 SYRINGE (ML) INJECTION AS NEEDED
Status: DISCONTINUED | OUTPATIENT
Start: 2022-08-22 | End: 2022-08-22 | Stop reason: HOSPADM

## 2022-08-22 RX ORDER — SODIUM CHLORIDE 0.9 % (FLUSH) 0.9 %
3 SYRINGE (ML) INJECTION EVERY 12 HOURS SCHEDULED
Status: DISCONTINUED | OUTPATIENT
Start: 2022-08-22 | End: 2022-08-22 | Stop reason: HOSPADM

## 2022-08-22 RX ORDER — FENTANYL CITRATE 50 UG/ML
INJECTION, SOLUTION INTRAMUSCULAR; INTRAVENOUS AS NEEDED
Status: DISCONTINUED | OUTPATIENT
Start: 2022-08-22 | End: 2022-08-22 | Stop reason: SURG

## 2022-08-22 RX ORDER — NALOXONE HCL 0.4 MG/ML
0.4 VIAL (ML) INJECTION AS NEEDED
Status: DISCONTINUED | OUTPATIENT
Start: 2022-08-22 | End: 2022-08-22 | Stop reason: HOSPADM

## 2022-08-22 RX ORDER — PROMETHAZINE HYDROCHLORIDE 25 MG/1
25 SUPPOSITORY RECTAL ONCE AS NEEDED
Status: DISCONTINUED | OUTPATIENT
Start: 2022-08-22 | End: 2022-08-22 | Stop reason: HOSPADM

## 2022-08-22 RX ORDER — DROPERIDOL 2.5 MG/ML
0.62 INJECTION, SOLUTION INTRAMUSCULAR; INTRAVENOUS
Status: DISCONTINUED | OUTPATIENT
Start: 2022-08-22 | End: 2022-08-22 | Stop reason: HOSPADM

## 2022-08-22 RX ORDER — HEPARIN SODIUM 5000 [USP'U]/ML
INJECTION, SOLUTION INTRAVENOUS; SUBCUTANEOUS AS NEEDED
Status: DISCONTINUED | OUTPATIENT
Start: 2022-08-22 | End: 2022-08-22 | Stop reason: HOSPADM

## 2022-08-22 RX ORDER — SODIUM CHLORIDE, SODIUM LACTATE, POTASSIUM CHLORIDE, CALCIUM CHLORIDE 600; 310; 30; 20 MG/100ML; MG/100ML; MG/100ML; MG/100ML
9 INJECTION, SOLUTION INTRAVENOUS CONTINUOUS
Status: DISCONTINUED | OUTPATIENT
Start: 2022-08-22 | End: 2022-08-22 | Stop reason: HOSPADM

## 2022-08-22 RX ORDER — PROPOFOL 10 MG/ML
VIAL (ML) INTRAVENOUS AS NEEDED
Status: DISCONTINUED | OUTPATIENT
Start: 2022-08-22 | End: 2022-08-22 | Stop reason: SURG

## 2022-08-22 RX ORDER — ONDANSETRON 2 MG/ML
4 INJECTION INTRAMUSCULAR; INTRAVENOUS ONCE AS NEEDED
Status: DISCONTINUED | OUTPATIENT
Start: 2022-08-22 | End: 2022-08-22 | Stop reason: HOSPADM

## 2022-08-22 RX ORDER — IPRATROPIUM BROMIDE AND ALBUTEROL SULFATE 2.5; .5 MG/3ML; MG/3ML
3 SOLUTION RESPIRATORY (INHALATION) ONCE AS NEEDED
Status: DISCONTINUED | OUTPATIENT
Start: 2022-08-22 | End: 2022-08-22 | Stop reason: HOSPADM

## 2022-08-22 RX ORDER — HYDROCODONE BITARTRATE AND ACETAMINOPHEN 5; 325 MG/1; MG/1
1 TABLET ORAL ONCE AS NEEDED
Status: DISCONTINUED | OUTPATIENT
Start: 2022-08-22 | End: 2022-08-22 | Stop reason: HOSPADM

## 2022-08-22 RX ORDER — SCOLOPAMINE TRANSDERMAL SYSTEM 1 MG/1
1 PATCH, EXTENDED RELEASE TRANSDERMAL CONTINUOUS
Status: DISCONTINUED | OUTPATIENT
Start: 2022-08-22 | End: 2022-08-22 | Stop reason: HOSPADM

## 2022-08-22 RX ORDER — FENTANYL CITRATE 50 UG/ML
INJECTION, SOLUTION INTRAMUSCULAR; INTRAVENOUS
Status: COMPLETED
Start: 2022-08-22 | End: 2022-08-22

## 2022-08-22 RX ORDER — HEPARIN SODIUM 5000 [USP'U]/ML
5000 INJECTION, SOLUTION INTRAVENOUS; SUBCUTANEOUS EVERY 8 HOURS SCHEDULED
Status: DISCONTINUED | OUTPATIENT
Start: 2022-08-22 | End: 2022-08-22 | Stop reason: HOSPADM

## 2022-08-22 RX ORDER — CEFAZOLIN SODIUM 2 G/100ML
2 INJECTION, SOLUTION INTRAVENOUS ONCE
Status: COMPLETED | OUTPATIENT
Start: 2022-08-22 | End: 2022-08-22

## 2022-08-22 RX ORDER — DROPERIDOL 2.5 MG/ML
0.62 INJECTION, SOLUTION INTRAMUSCULAR; INTRAVENOUS ONCE AS NEEDED
Status: DISCONTINUED | OUTPATIENT
Start: 2022-08-22 | End: 2022-08-22 | Stop reason: HOSPADM

## 2022-08-22 RX ORDER — ROCURONIUM BROMIDE 10 MG/ML
INJECTION, SOLUTION INTRAVENOUS AS NEEDED
Status: DISCONTINUED | OUTPATIENT
Start: 2022-08-22 | End: 2022-08-22 | Stop reason: SURG

## 2022-08-22 RX ORDER — MIDAZOLAM HYDROCHLORIDE 1 MG/ML
1 INJECTION INTRAMUSCULAR; INTRAVENOUS
Status: DISCONTINUED | OUTPATIENT
Start: 2022-08-22 | End: 2022-08-22 | Stop reason: HOSPADM

## 2022-08-22 RX ORDER — FENTANYL CITRATE 50 UG/ML
50 INJECTION, SOLUTION INTRAMUSCULAR; INTRAVENOUS
Status: DISCONTINUED | OUTPATIENT
Start: 2022-08-22 | End: 2022-08-22 | Stop reason: HOSPADM

## 2022-08-22 RX ORDER — SODIUM CHLORIDE 0.9 % (FLUSH) 0.9 %
10 SYRINGE (ML) INJECTION AS NEEDED
Status: DISCONTINUED | OUTPATIENT
Start: 2022-08-22 | End: 2022-08-22 | Stop reason: HOSPADM

## 2022-08-22 RX ORDER — PROMETHAZINE HYDROCHLORIDE 25 MG/1
25 TABLET ORAL ONCE AS NEEDED
Status: DISCONTINUED | OUTPATIENT
Start: 2022-08-22 | End: 2022-08-22 | Stop reason: HOSPADM

## 2022-08-22 RX ORDER — MIDAZOLAM HYDROCHLORIDE 1 MG/ML
INJECTION INTRAMUSCULAR; INTRAVENOUS AS NEEDED
Status: DISCONTINUED | OUTPATIENT
Start: 2022-08-22 | End: 2022-08-22 | Stop reason: SURG

## 2022-08-22 RX ADMIN — FENTANYL CITRATE 50 MCG: 50 INJECTION, SOLUTION INTRAMUSCULAR; INTRAVENOUS at 12:32

## 2022-08-22 RX ADMIN — SUGAMMADEX 200 MG: 100 INJECTION, SOLUTION INTRAVENOUS at 11:48

## 2022-08-22 RX ADMIN — ROCURONIUM BROMIDE 20 MG: 50 INJECTION, SOLUTION INTRAVENOUS at 10:16

## 2022-08-22 RX ADMIN — DROPERIDOL 0.62 MG: 2.5 INJECTION, SOLUTION INTRAMUSCULAR; INTRAVENOUS at 12:12

## 2022-08-22 RX ADMIN — MIDAZOLAM HYDROCHLORIDE 2 MG: 1 INJECTION, SOLUTION INTRAMUSCULAR; INTRAVENOUS at 07:43

## 2022-08-22 RX ADMIN — DEXAMETHASONE SODIUM PHOSPHATE 10 MG: 4 INJECTION, SOLUTION INTRA-ARTICULAR; INTRALESIONAL; INTRAMUSCULAR; INTRAVENOUS; SOFT TISSUE at 07:51

## 2022-08-22 RX ADMIN — FENTANYL CITRATE 100 MCG: 50 INJECTION, SOLUTION INTRAMUSCULAR; INTRAVENOUS at 07:44

## 2022-08-22 RX ADMIN — FAMOTIDINE 20 MG: 20 TABLET ORAL at 06:32

## 2022-08-22 RX ADMIN — CEFAZOLIN SODIUM 2 G: 2 INJECTION, SOLUTION INTRAVENOUS at 11:51

## 2022-08-22 RX ADMIN — LIDOCAINE HYDROCHLORIDE 50 MG: 10 INJECTION, SOLUTION EPIDURAL; INFILTRATION; INTRACAUDAL; PERINEURAL at 07:44

## 2022-08-22 RX ADMIN — ROCURONIUM BROMIDE 10 MG: 50 INJECTION, SOLUTION INTRAVENOUS at 08:41

## 2022-08-22 RX ADMIN — LIDOCAINE HYDROCHLORIDE 0.2 ML: 10 INJECTION, SOLUTION EPIDURAL; INFILTRATION; INTRACAUDAL; PERINEURAL at 06:08

## 2022-08-22 RX ADMIN — SCOPALAMINE 1 PATCH: 1 PATCH, EXTENDED RELEASE TRANSDERMAL at 06:31

## 2022-08-22 RX ADMIN — CEFAZOLIN SODIUM 2 G: 2 INJECTION, SOLUTION INTRAVENOUS at 07:51

## 2022-08-22 RX ADMIN — FENTANYL CITRATE 50 MCG: 50 INJECTION, SOLUTION INTRAMUSCULAR; INTRAVENOUS at 09:36

## 2022-08-22 RX ADMIN — FENTANYL CITRATE 50 MCG: 50 INJECTION, SOLUTION INTRAMUSCULAR; INTRAVENOUS at 10:06

## 2022-08-22 RX ADMIN — ROCURONIUM BROMIDE 20 MG: 50 INJECTION, SOLUTION INTRAVENOUS at 09:23

## 2022-08-22 RX ADMIN — MEPERIDINE HYDROCHLORIDE 12.5 MG: 25 INJECTION INTRAMUSCULAR; INTRAVENOUS; SUBCUTANEOUS at 12:20

## 2022-08-22 RX ADMIN — ROCURONIUM BROMIDE 10 MG: 50 INJECTION, SOLUTION INTRAVENOUS at 08:33

## 2022-08-22 RX ADMIN — PROPOFOL 50 MG: 10 INJECTION, EMULSION INTRAVENOUS at 07:45

## 2022-08-22 RX ADMIN — ONDANSETRON 4 MG: 2 INJECTION INTRAMUSCULAR; INTRAVENOUS at 11:30

## 2022-08-22 RX ADMIN — SODIUM CHLORIDE, POTASSIUM CHLORIDE, SODIUM LACTATE AND CALCIUM CHLORIDE 9 ML/HR: 600; 310; 30; 20 INJECTION, SOLUTION INTRAVENOUS at 06:08

## 2022-08-22 RX ADMIN — PROPOFOL 25 MCG/KG/MIN: 10 INJECTION, EMULSION INTRAVENOUS at 07:53

## 2022-08-22 RX ADMIN — PREGABALIN 75 MG: 75 CAPSULE ORAL at 06:32

## 2022-08-22 RX ADMIN — PROPOFOL 150 MG: 10 INJECTION, EMULSION INTRAVENOUS at 07:44

## 2022-08-22 RX ADMIN — ROCURONIUM BROMIDE 40 MG: 50 INJECTION, SOLUTION INTRAVENOUS at 07:44

## 2022-08-22 RX ADMIN — SODIUM CHLORIDE, POTASSIUM CHLORIDE, SODIUM LACTATE AND CALCIUM CHLORIDE: 600; 310; 30; 20 INJECTION, SOLUTION INTRAVENOUS at 11:13

## 2022-08-22 NOTE — ANESTHESIA PREPROCEDURE EVALUATION
Anesthesia Evaluation                  Airway   Mallampati: I  TM distance: >3 FB  Neck ROM: full  No difficulty expected  Dental      Pulmonary    (+) asthma,recent URI,   Cardiovascular     ECG reviewed        Neuro/Psych  (+) psychiatric history Anxiety and Depression,    GI/Hepatic/Renal/Endo      Musculoskeletal     Abdominal    Substance History      OB/GYN          Other                        Anesthesia Plan    ASA 2     general     (pecs per surgeon req)  intravenous induction     Anesthetic plan, risks, benefits, and alternatives have been provided, discussed and informed consent has been obtained with: patient.    Plan discussed with CRNA.        CODE STATUS:

## 2022-08-22 NOTE — H&P
"Pre-Op H&P  Alicia Diaz  [id number]  1980    Chief complaint: \"Want a breast lift\"    HPI:    Patient is a 41 y.o.female who presents with cosmetic want of a breast lift.  Patient denies any pain or systemic symptoms.    Review of Systems:  General ROS: negative for chills, fever or skin lesions;  No changes since last office visit.  Neg for recent sick exposure  Cardiovascular ROS: no chest pain or dyspnea on exertion  Respiratory ROS: no cough, shortness of breath, or wheezing    Allergies:   Allergies   Allergen Reactions   • Bactrim [Sulfamethoxazole-Trimethoprim] Anaphylaxis, Hives, Rash and GI Intolerance     And fever        Home Meds:    No current facility-administered medications on file prior to encounter.     Current Outpatient Medications on File Prior to Encounter   Medication Sig Dispense Refill   • ALPRAZolam (XANAX) 0.5 MG tablet TAKE 1 TABLET BY MOUTH TWICE DAILY AS NEEDED FOR ANXIETY (Patient taking differently: Take 0.5 mg by mouth As Needed for Anxiety or Sleep.) 30 tablet 2   • loratadine (CLARITIN) 10 MG tablet Take 1 tablet by mouth Daily.     • albuterol (PROVENTIL) (2.5 MG/3ML) 0.083% nebulizer solution Take 2.5 mg by nebulization Every 4 (Four) Hours As Needed for Wheezing.     • albuterol sulfate  (90 Base) MCG/ACT inhaler INHALE 2 PUFFS BY MOUTH EVERY 4 HOURS AS NEEDED FOR WHEEZING (Patient taking differently: Inhale 2 puffs Every 4 (Four) Hours As Needed.) 8.5 g 5   • Spiriva Respimat 1.25 MCG/ACT aerosol solution inhaler INHALE 2 PUFFS BY MOUTH DAILY (Patient taking differently: Inhale 2 puffs Daily.) 4 g 1       PMH:   Past Medical History:   Diagnosis Date   • Allergy    • Anxiety    • Asthma    • Asthma    • Cancer (HCC)     squamous upper abd and back   • Family history of asthma    • IBS (irritable bowel syndrome)    • Irritable bowel syndrome    • Wears eyeglasses      PSH:    Past Surgical History:   Procedure Laterality Date   •  SECTION Bilateral " 2018    Procedure:  SECTION PRIMARY;  Surgeon: Mariam Kim DO;  Location: [facility] LABOR DELIVERY;  Service:    • SKIN BIOPSY     • WIDE EXCISION LESION/MASS TRUNK      back and upper abd   • WISDOM TOOTH EXTRACTION       Patient denies allergy to contrast dye or latex  Immunization History:  Influenza: Yes  Pneumococcal: No  Tetanus: Up-to-date    Social History:   Tobacco:   Social History     Tobacco Use   Smoking Status Never Smoker   Smokeless Tobacco Never Used      Alcohol:     Social History     Substance and Sexual Activity   Alcohol Use Yes   • Alcohol/week: 2.0 standard drinks   • Types: 2 Glasses of wine per week       Vitals:           /90 (BP Location: Right arm, Patient Position: Lying)   Pulse 69   Temp 97.2 °F (36.2 °C) (Temporal)   Resp 18   SpO2 97%     Physical Exam:  General Appearance:    Alert, cooperative, no distress, appears stated age   Head:    Normocephalic, without obvious abnormality, atraumatic   Lungs:    Clear to auscultation bilaterally to the bases    Heart:  S1-S2 without rubs murmurs or gallops    Abdomen:   Soft, nontender, bowel sounds present throughout.   Breast Exam:    deferred   Genitalia:    deferred   Extremities:   Extremities normal, atraumatic, no cyanosis or edema   Skin:   Skin color, texture, turgor normal, no rashes or lesions   Neurologic:   Grossly intact   Results Review  LABS:  Lab Results   Component Value Date    WBC 2.67 (L) 08/15/2022    HGB 13.0 08/15/2022    HCT 38.6 08/15/2022    MCV 95.5 08/15/2022     08/15/2022    NEUTROABS 7.91 2018    GLUCOSE 95 08/15/2022    BUN 13 08/15/2022    CREATININE 0.69 08/15/2022     (L) 08/15/2022    K 3.9 08/15/2022     08/15/2022    CO2 25.0 08/15/2022    CALCIUM 8.9 08/15/2022    ALBUMIN 4.50 08/15/2022    AST 20 08/15/2022    ALT 18 08/15/2022    BILITOT 0.7 08/15/2022       RADIOLOGY:  No radiology results for the last 3 days     Assessment: 41yoF nonsmoker with  bilateral symptomatic macromastia/ptosis.    Breast Goal:  36 C/D to an Ideal small C/ full B cup, lifted, and address lateral flanks    Plan:  - again based on PE patient would benefit from a breast mastopexy. Patient’s schnur scale of 338 grams would be too much based on PE and goals.  Patient would benefit from a superior pedicle wise pattern excision.  - discussed the risk and benefits of the procedure including complications (scars, tissue loss, wound healing issues particularly at the T junction of the incision, hematoma, loss or change in nipple sensation, inability to breast feed, nipple loss, infection, pulmonary embolism, death).  - showed diagrams and pictures explaining the surgery  - to OR for bilateral mastopexy superior pedicle wise pattern excision,  outpatient      FRIDA Rodriguez   08/22/22   6:46 AM EDT

## 2022-08-22 NOTE — H&P
Plastic Surgery Consult      Admission Date:  2022  LOS:  0  Patient Care Team:  Curry Gill MD as PCP - General (Internal Medicine)  Mariam Kim DO as Consulting Physician (Obstetrics and Gynecology)    Patient Name:  Alicia Diaz  :  1980  MRN:  [id number]    Date:  2022    H&P update  Breast Reduction Consult  Chief Complaint: Back pain    HPI:  Alicia Diaz is a 41yoF nonsmoker with bilateral symptomatic macromastia.    Patient states upper back and neck pain with shoulder grooving. Patient has wanted to do this for a long time. Patient states she is done with having children. Patient has seen a chiropractor in the past with minimal improvement. Patient states no family history of breast cancer. Patient states no breast surgery. Patient states she has difficulty working out and daily activities due to pain and discomfort. Patient was seen in clinic and based on PE and goals was more appropriate for a mastopexy.    Breast Goal:  36 C/D to an Ideal small C/ full B cup, lifted, and address lateral flanks    PMH: None  PSH: C section x2  SH: nonmoker, denies drinking or illicit drug use. Lives in [place], Home support.  FH:  No Family Hx of breast cancer  Medications: No blood thinner or steroids  Mammogram: 8/3/22  Study Result    Narrative & Impression  BILATERAL DIAGNOSTIC MAMMOGRAM WITH TOMOSYNTHESIS AND LEFT BREAST  ULTRASOUND  FINDINGS: The breast tissue is heterogeneously dense, which may obscure  small masses. Asymmetries in the right inferior breast and a possible  area of architectural distortion in the right lateral breast effaced  with focal compression imaging. A nodular asymmetry in the left lateral  breast persisted on focal compression imaging, but appeared to represent  summation of fibroglandular tissue and a 10 degree CC view. There are no  suspicious masses, worrisome calcifications, areas of architectural  distortion, or other secondary mammographic  signs of malignancy.  Focused ultrasound imaging of the left lateral breast demonstrates  unremarkable fibroglandular tissue. No solid masses, cysts, or focal  areas of shadowing were identified.  IMPRESSION:  No findings suspicious for malignancy on bilateral  mammographic imaging and focused ultrasound imaging of the left lateral  breast.  RECOMMENDATION: Annual screening mammography.  BI-RADS CATEGORY 1, NEGATIVE.      History:   Past Medical History:   Diagnosis Date   • Allergy    • Anxiety    • Asthma    • Asthma    • Cancer (HCC)     squamous upper abd and back   • Family history of asthma    • IBS (irritable bowel syndrome)    • Irritable bowel syndrome    • Wears eyeglasses      Past Surgical History:   Procedure Laterality Date   •  SECTION Bilateral 2018    Procedure:  SECTION PRIMARY;  Surgeon: Mariam Kim DO;  Location: [facility] LABOR DELIVERY;  Service:    • SKIN BIOPSY     • WIDE EXCISION LESION/MASS TRUNK      back and upper abd   • WISDOM TOOTH EXTRACTION       Family History   Problem Relation Age of Onset   • Diabetes Paternal Grandmother    • Asthma Other    • Breast cancer Neg Hx    • Endometrial cancer Neg Hx    • Ovarian cancer Neg Hx      Social History     Socioeconomic History   • Marital status:    Tobacco Use   • Smoking status: Never Smoker   • Smokeless tobacco: Never Used   Vaping Use   • Vaping Use: Never used   Substance and Sexual Activity   • Alcohol use: Yes     Alcohol/week: 2.0 standard drinks     Types: 2 Glasses of wine per week   • Drug use: No   • Sexual activity: Yes     Partners: Male     Allergies   Allergen Reactions   • Bactrim [Sulfamethoxazole-Trimethoprim] Anaphylaxis, Hives, Rash and GI Intolerance     And fever        Medication:    Current Facility-Administered Medications:   •  ceFAZolin in dextrose (ANCEF) IVPB solution 2 g, 2 g, Intravenous, Once, Jared Tavarez MD  •  heparin (porcine) 5000 UNIT/ML injection 5,000 Units, 5,000  Units, Subcutaneous, Q8H, Jared Tavarez MD  •  lactated ringers infusion, 9 mL/hr, Intravenous, Continuous, Freddy Suarez MD, Last Rate: 9 mL/hr at 22 0704, Currently Infusing at 22 0704  •  midazolam (VERSED) injection 1 mg, 1 mg, Intravenous, Q10 Min PRN, Freddy Suarez MD  •  scopolamine patch 1 mg/72 hr, 1 patch, Transdermal, Continuous, Jared Tavarez MD, 1 patch at 22 0631  •  sodium chloride 0.9 % flush 10 mL, 10 mL, Intravenous, Q12H, Freddy Suarez MD  •  sodium chloride 0.9 % flush 10 mL, 10 mL, Intravenous, PRN, Freddy Suarez MD    Antibiotics:  Anti-Infectives (From admission, onward)    Ordered     Dose/Rate Route Frequency Start Stop    22 0608  ceFAZolin in dextrose (ANCEF) IVPB solution 2 g        Ordering Provider: Jared Tavarez MD    2 g  over 30 Minutes Intravenous Once 22 0610            Allergies   Allergen Reactions   • Bactrim [Sulfamethoxazole-Trimethoprim] Anaphylaxis, Hives, Rash and GI Intolerance     And fever           Review of Systems:  Constitutional-- No Fever, chills or sweats.  Appetite okay, and no malaise. No fatigue.  HEENT-- No new vision, hearing or throat complaints.  No epistaxis or oral sores.  Denies odynophagia or dysphagia. No headache, photophobia or neck stiffness.  CV-- No chest pain, palpitation or syncope  Resp-- No SOB/cough/Hemoptysis  GI- No nausea, vomiting, or diarrhea.  No hematochezia, melena, or hematemesis. Denies jaundice or chronic liver disease.  -- No dysuria, hematuria, or flank pain.  Denies hesitancy, urgency or flank pain.  Lymph- no swollen lymph nodes in neck/axilla or groin.  Heme- No active bruising or bleeding; no Hx of DVT or PE.  MS--see HPI    Neuro-- No acute focal weakness or numbness in the arms or legs.  No seizures.  Skin--No rashes.         Physical Exam:   Vital Signs:  Temp (24hrs), Av.2 °F (36.2 °C), Min:97.2 °F (36.2 °C), Max:97.2 °F (36.2 °C)    Temp  Min: 97.2 °F (36.2 °C)  Max:  97.2 °F (36.2 °C)  BP  Min: 121/90  Max: 121/90  Pulse  Min: 69  Max: 69  Resp  Min: 18  Max: 18  SpO2  Min: 97 %  Max: 97 %    Ht: 64 inches  Wt: 135 lbs  BMI: 23  BSA: 1.66 schnur scale 338 grams    General: Well developed, well nourished, alert and cooperative, and appears to be in no acute distress.  Head: normocephalic.  HEENT: Normocephalic, atraumatic.  EOMI. No conjunctival injection. No icterus. No nasal discharge.  NECK: Neck supple, non-tender without lymphadenopathy.  Heart: Rhythm is regular. There is no peripheral edema, cyanosis or pallor. Extremities are warm and well perfused. Capillary refill is less than 2 seconds.  Lungs: Normal respiratory effort. Nonlabored. No dullness.  Abdomen: Soft, nontender, nondistended. No rebound or guarding.  Musculoskeletal: ROM intact spine and extremities. No joint erythema or tenderness. Normal muscular development. Normal gait.    Breast Exam: bilateral grade II breast ptosis, medium sized areola. Lateral displaced areolas, component of symmastia, fairly symmetric in shape and volume    Rt Breast  Breast BW: 14 cm  SN-N: 25 cm  N-IMF: 11 cm  Raise the Rt nipple: 2-3 cm    Lt Breast  Breast BW: 14 cm  SN-N: 25 cm  N-IMF: 11 cm  Raise the Rt nipple: 1-2 cm      Laboratory Data:  Results from last 7 days   Lab Units 08/15/22  0919   WBC 10*3/mm3 2.67*   HEMOGLOBIN g/dL 13.0   HEMATOCRIT % 38.6   PLATELETS 10*3/mm3 148     Results from last 7 days   Lab Units 08/15/22  0919   SODIUM mmol/L 135*   POTASSIUM mmol/L 3.9   CHLORIDE mmol/L 101   CO2 mmol/L 25.0   BUN mg/dL 13   CREATININE mg/dL 0.69   GLUCOSE mg/dL 95   CALCIUM mg/dL 8.9     Results from last 7 days   Lab Units 08/15/22  0919   ALK PHOS U/L 49   BILIRUBIN mg/dL 0.7   ALT (SGPT) U/L 18   AST (SGOT) U/L 20                         Estimated Creatinine Clearance: 103.3 mL/min (by C-G formula based on SCr of 0.69 mg/dL).    Microbiology:  No results found for: ACANTHNAEG, AFBCX, BPERTUSSISCX, BLOODCX  No  results found for: BCIDPCR, CXREFLEX, CSFCX, CULTURETIS  No results found for: CULTURES, HSVCX, URCX  No results found for: EYECULTURE, GCCX, HSVCULTURE, LABHSV  No results found for: LEGIONELLA, MRSACX, MUMPSCX, MYCOPLASCX  No results found for: NOCARDIACX, STOOLCX  No results found for: THROATCX, UNSTIMCULT, URINECX, CULTURE, VZVCULTUR  No results found for: VIRALCULTU, WOUNDCX          Assessment: 41yoF nonsmoker with bilateral symptomatic macromastia/ptosis.    Breast Goal:  36 C/D to an Ideal small C/ full B cup, lifted, and address lateral flanks    Plan:  - again based on PE patient would benefit from a breast mastopexy. Patient’s schnur scale of 338 grams would be too much based on PE and goals.  Patient would benefit from a superior pedicle wise pattern excision.  - discussed the risk and benefits of the procedure including complications (scars, tissue loss, wound healing issues particularly at the T junction of the incision, hematoma, loss or change in nipple sensation, inability to breast feed, nipple loss, infection, pulmonary embolism, death).  - showed diagrams and pictures explaining the surgery  - to OR for bilateral mastopexy superior pedicle wise pattern excision,  outpatient        Jared Tavarez MD  08/22/22  07:27 EDT

## 2022-08-22 NOTE — ANESTHESIA PROCEDURE NOTES
Peripheral Block      Patient reassessed immediately prior to procedure    Patient location during procedure: OR  Reason for block: at surgeon's request and post-op pain management  Preanesthetic Checklist  Completed: patient identified, IV checked, site marked, risks and benefits discussed, surgical consent, monitors and equipment checked, pre-op evaluation and timeout performed  Prep:  Pt Position: supine  Sterile barriers:cap, gloves, gown and mask  Prep: ChloraPrep  Patient monitoring: blood pressure monitoring, continuous pulse oximetry and EKG  Procedure  Performed under: general  Guidance:ultrasound guided and landmark technique  Images:still images obtained, printed/placed on chart    Laterality:Bilateral  Block Type:PECS I and PECS II  Injection Technique:single-shot  Needle Type:short-bevel  Needle Gauge:20 G  Resistance on Injection: none          Medications  Preservative Free Saline:10ml  Comment:Block Injection:  Total volume of LA divided between Right and Left sided blocks         Post Assessment  Injection Assessment: negative aspiration for heme and incremental injection  Patient Tolerance:comfortable throughout block  Complications:no  Additional Notes  The pt. Was placed in the Supine Position and GA was induced     The insertion site was prepped with CHG and Ultrasound guidance with In-Plane techniquewas  a 4inch BBraun 360 degree echogenic needle was visualized.  Normal Saline PSF was  utilized for hydrodissection of tissue. PECS 1 Block- Pectoralis Major and Minor where identified and LA was injected between PMM and PmM at the level of the 3rd Rib(10ml),  PECS 2-  Pectoralis Minor and Serratus muscle where identified and the needle was advanced laterally in-plane with the 4th rib as a backstop, pleura was monitored.  LA was injected between SA and PmM at the level of 4th rib( 20ml).  LA injection spread was visualized, injection was incremental 1-5ml, normal or low injection pressure, no  intravascular injection, no pneumothorax appreciated.  Thank You.

## 2022-08-22 NOTE — ANESTHESIA POSTPROCEDURE EVALUATION
Patient: Alicia Diaz    Procedure Summary     Date: 08/22/22 Room / Location:  [facility]    Anesthesia Start: 0737 Anesthesia Stop: 1206    Procedure: MASTOPEXY- BILATERAL (Bilateral Breast) Diagnosis:     Surgeons: Jared Tavarez MD Provider: Freddy Suarez MD    Anesthesia Type: general ASA Status: 2          Anesthesia Type: general    Vitals  Vitals Value Taken Time   /72 08/22/22 0925   Temp     Pulse 90 08/22/22 0925   Resp     SpO2 96 % 08/22/22 0925   Vitals shown include unvalidated device data.        Post Anesthesia Care and Evaluation    Patient location during evaluation: PACU  Patient participation: complete - patient participated  Level of consciousness: responsive to verbal stimuli  Pain management: adequate    Airway patency: patent  Anesthetic complications: No anesthetic complications  PONV Status: none  Cardiovascular status: hemodynamically stable and acceptable  Respiratory status: nonlabored ventilation, acceptable, nasal cannula and oral airway  Hydration status: acceptable    Comments: /74  Sats 95%  HR 75  Temp 97.5

## 2022-08-22 NOTE — OP NOTE
Patient Name:  Alicia Diaz  :  1980  MRN:  [id number]    Date of Surgery:  2022       Pre Operative Diagnosis: Macromastia  Post Operative Diagnosis:   Macromastia    PROCEDURES PERFORMED:  1) bilateral breast mastopexy superior pedicle wise pattern excision with lateral breast liposuction    SURGEON: Jared Tavarez MD      Staff:  Surgeon(s):  Jared Tavarez MD    Circulator: Abby Martini RN; Marin Bates RN  Physician Assistant: Hina Waters PA  Scrub Person: Patsy Olmos       Anesthesia: General      Indications for the Procedure:    Mrs Diaz is a 41yoF nonsmoker with bilateral symptomatic macromastia/ptosis. Patient was seen in clinic and found more appropriate for a mastopexy vs a breast reduction. Patient was then scheduled for OR.     Operative Findings:  Bilateral breast ptosis. Rt breast greater than Lt breast in volume. Lt lateral breast fullness greater than right.     Description of the Procedure:  Patient was seen in preop risks and benefits were discussed with the patient who elected to proceed with the procedure. H&P was updated, consent was obtained, and patient was marked appropriately. Patient was then taken back to the OR placed in a supine position. Anesthesia was induced and the patient was then intubated. The patient was the prepped and draped in a sterile fashion. A time out was performed in which the patient and the procedure were gone over. Everyone was in agreement in terms of the patient and the procedure.       Bilateral breast were tailored tacked into position using stapler and two pickups mostly vertical and horizontal component. The patient was setup and nipple position was marked and measured for symmetry. A 38 mm cookie cutter was used to elva the areola. The patient was then laid back down.        Starting with the left breast. The marker was used to outline the pattern followed by stapler removal and remarking the planned pattern. A 45 mm  areola marker was then used to measure the areola to be used. A 15 blade was then used to score the areola marker. The tailored tacked pattern and pedicle was then scored with a 15 blade. A 10 blade was then used to de epithelize the superior pedicle. The bovie cautery was then used to outline the pedicle. Once the pedicle was defined. The inferior breast tissue was excised. The breast tissue was then placed on the back table to be measured. The breast was then irrigated, and hemostasis was obtained. The flaps were then brought together with a 2-0 vicryl suture. The breast was then tailored tacked into position.       Attention then went to the right breast.  The marker was used to outline the pattern followed by stapler removal and remarking the planned pattern.   A 45 mm areola marker was then used to measure the areola to be used. A 15 blade was then used to score the areola marker. The tailored tacked pattern and pedicle was then scored with a 15 blade. A 10 blade was then used to de epithelize the superior pedicle. The bovie cautery was then used to outline the pedicle. Once the pedicle was defined. The inferior breast tissue was excised. The breast tissue was then placed on the back table to be measured. The breast was then irrigated, and hemostasis was obtained. The flaps were then brought together with a 2-0 vicryl suture. The breast was then tailored tacked into position.       Patient was then setup noting symmetry and areolas/nipples were re measured for confirm symmetry. There was noted fullness of the lateral breast tissue onto the chest flank worse on the left than right. At this point in time it was decided to do liposuction for the bilateral fullness. Tumescent solution was placed 100 cc per side. A 4.6 mm cannula was used to perform separation of fat followed by aspiration of fat bilaterally. The right breast 100 cc was removed and the left breast 200 cc was removed for a total of 300 cc of  liposuction.      The nipples were placed into position. The breast at this point were then closed. The areola was closed with 3-0 monocryl deep dermal stitches followed by a running 4-0 monocryl. The vertical limb was closed with deep pillar 2-0 vicryl stitches followed by deep dermal 3-0 monocryl stitches and a running 4-0 subcuticular stitch. The horizontal limb was closed with deep 3-0 monocryl followed by 4-0 monocryl. Steri strips over the incision. The breast was then dressed with kerlix fluffs and a ACE wrap. Nipples were pink and perfused at the end of the case.      At this point in time the procedure had finished. Patient was then extubated and taken to PACU for full recovery.       Implants:  None  Implant Name Type Inv. Item Serial No.  Lot No. LRB No. Used Action   DEV CONTRL TISS STRATAFIX SPIRAL MNCRYL UD 3/0 PLS 60CM - [id number] Implant DEV CONTRL TISS STRATAFIX SPIRAL MNCRYL UD 3/0 PLS 60CM  [employer]  Right 2 Implanted       Specimen Removed:   Rt breast: 241 grams  Lt breast: 176 grams       Estimated Blood Loss: 100 cc    Drains Placed: None    Complications: none immediate      Jared Tavarez MD     Date: 8/22/2022  Time: 12:13 EDT

## 2022-08-22 NOTE — DISCHARGE INSTRUCTIONS
Discharge home  Follow up with Dr. Jared Tavarez 8/23/22 at 3:30 PM  No showers or baths  Keep dressing in place  No heavy lifting  Regular diet  Ambulate at home

## 2022-08-22 NOTE — ANESTHESIA PROCEDURE NOTES
Airway  Urgency: elective    Date/Time: 8/22/2022 7:48 AM  Airway not difficult    General Information and Staff    Patient location during procedure: OR  Anesthesiologist: Freddy Suarez MD  CRNA/CAA: Maria Del Rosario Frias CRNA    Indications and Patient Condition  Indications for airway management: airway protection    Preoxygenated: yes  MILS not maintained throughout  Mask difficulty assessment: 1 - vent by mask    Final Airway Details  Final airway type: endotracheal airway      Successful airway: ETT  Cuffed: yes   Successful intubation technique: direct laryngoscopy  Facilitating devices/methods: intubating stylet  Endotracheal tube insertion site: oral  Blade: Micaela  Blade size: 3  ETT size (mm): 7.0  Cormack-Lehane Classification: grade I - full view of glottis  Placement verified by: chest auscultation and capnometry   Measured from: lips  ETT/EBT  to lips (cm): 21  Number of attempts at approach: 1  Assessment: lips, teeth, and gum same as pre-op and atraumatic intubation    Additional Comments  Negative epigastric sounds, Breath sound equal bilaterally with symmetric chest rise and fall

## 2022-08-24 LAB
CYTO UR: NORMAL
LAB AP CASE REPORT: NORMAL
LAB AP CLINICAL INFORMATION: NORMAL
LAB AP DIAGNOSIS COMMENT: NORMAL
PATH REPORT.FINAL DX SPEC: NORMAL
PATH REPORT.GROSS SPEC: NORMAL

## 2022-10-04 ENCOUNTER — OFFICE VISIT (OUTPATIENT)
Dept: INTERNAL MEDICINE | Facility: CLINIC | Age: 42
End: 2022-10-04

## 2022-10-04 ENCOUNTER — LAB (OUTPATIENT)
Dept: LAB | Facility: HOSPITAL | Age: 42
End: 2022-10-04

## 2022-10-04 VITALS
BODY MASS INDEX: 25.27 KG/M2 | TEMPERATURE: 97.8 F | WEIGHT: 148 LBS | HEIGHT: 64 IN | DIASTOLIC BLOOD PRESSURE: 82 MMHG | SYSTOLIC BLOOD PRESSURE: 116 MMHG | HEART RATE: 65 BPM

## 2022-10-04 DIAGNOSIS — Z13.21 ENCOUNTER FOR VITAMIN DEFICIENCY SCREENING: ICD-10-CM

## 2022-10-04 DIAGNOSIS — Z13.29 THYROID DISORDER SCREENING: ICD-10-CM

## 2022-10-04 DIAGNOSIS — J45.41 MODERATE PERSISTENT ASTHMATIC BRONCHITIS WITH ACUTE EXACERBATION: ICD-10-CM

## 2022-10-04 DIAGNOSIS — Z79.899 HIGH RISK MEDICATION USE: ICD-10-CM

## 2022-10-04 DIAGNOSIS — Z00.00 ROUTINE MEDICAL EXAM: Primary | ICD-10-CM

## 2022-10-04 DIAGNOSIS — F41.9 ANXIETY: ICD-10-CM

## 2022-10-04 DIAGNOSIS — Z13.220 LIPID SCREENING: ICD-10-CM

## 2022-10-04 DIAGNOSIS — Z00.00 ROUTINE MEDICAL EXAM: ICD-10-CM

## 2022-10-04 LAB
ALBUMIN SERPL-MCNC: 4.4 G/DL (ref 3.5–5.2)
ALBUMIN/GLOB SERPL: 2 G/DL
ALP SERPL-CCNC: 43 U/L (ref 39–117)
ALT SERPL W P-5'-P-CCNC: 25 U/L (ref 1–33)
AMPHET+METHAMPHET UR QL: NEGATIVE
AMPHETAMINES UR QL: NEGATIVE
ANION GAP SERPL CALCULATED.3IONS-SCNC: 18.8 MMOL/L (ref 5–15)
AST SERPL-CCNC: 24 U/L (ref 1–32)
BARBITURATES UR QL SCN: NEGATIVE
BASOPHILS # BLD AUTO: 0.04 10*3/MM3 (ref 0–0.2)
BASOPHILS NFR BLD AUTO: 0.9 % (ref 0–1.5)
BENZODIAZ UR QL SCN: NEGATIVE
BILIRUB SERPL-MCNC: 0.6 MG/DL (ref 0–1.2)
BUN SERPL-MCNC: 15 MG/DL (ref 6–20)
BUN/CREAT SERPL: 18.3 (ref 7–25)
BUPRENORPHINE SERPL-MCNC: NEGATIVE NG/ML
CALCIUM SPEC-SCNC: 9.7 MG/DL (ref 8.6–10.5)
CANNABINOIDS SERPL QL: NEGATIVE
CHLORIDE SERPL-SCNC: 100 MMOL/L (ref 98–107)
CHOLEST SERPL-MCNC: 232 MG/DL (ref 0–200)
CO2 SERPL-SCNC: 17.2 MMOL/L (ref 22–29)
COCAINE UR QL: NEGATIVE
CREAT SERPL-MCNC: 0.82 MG/DL (ref 0.57–1)
DEPRECATED RDW RBC AUTO: 40.2 FL (ref 37–54)
EGFRCR SERPLBLD CKD-EPI 2021: 92.9 ML/MIN/1.73
EOSINOPHIL # BLD AUTO: 0.07 10*3/MM3 (ref 0–0.4)
EOSINOPHIL NFR BLD AUTO: 1.5 % (ref 0.3–6.2)
ERYTHROCYTE [DISTWIDTH] IN BLOOD BY AUTOMATED COUNT: 11.8 % (ref 12.3–15.4)
GLOBULIN UR ELPH-MCNC: 2.2 GM/DL
GLUCOSE SERPL-MCNC: 94 MG/DL (ref 65–99)
HCT VFR BLD AUTO: 39.8 % (ref 34–46.6)
HDLC SERPL-MCNC: 74 MG/DL (ref 40–60)
HGB BLD-MCNC: 13.8 G/DL (ref 12–15.9)
IMM GRANULOCYTES # BLD AUTO: 0.04 10*3/MM3 (ref 0–0.05)
IMM GRANULOCYTES NFR BLD AUTO: 0.9 % (ref 0–0.5)
LDLC SERPL CALC-MCNC: 115 MG/DL (ref 0–100)
LDLC/HDLC SERPL: 1.45 {RATIO}
LYMPHOCYTES # BLD AUTO: 1.26 10*3/MM3 (ref 0.7–3.1)
LYMPHOCYTES NFR BLD AUTO: 27.8 % (ref 19.6–45.3)
MCH RBC QN AUTO: 32.4 PG (ref 26.6–33)
MCHC RBC AUTO-ENTMCNC: 34.7 G/DL (ref 31.5–35.7)
MCV RBC AUTO: 93.4 FL (ref 79–97)
METHADONE UR QL SCN: NEGATIVE
MONOCYTES # BLD AUTO: 0.47 10*3/MM3 (ref 0.1–0.9)
MONOCYTES NFR BLD AUTO: 10.4 % (ref 5–12)
NEUTROPHILS NFR BLD AUTO: 2.66 10*3/MM3 (ref 1.7–7)
NEUTROPHILS NFR BLD AUTO: 58.5 % (ref 42.7–76)
NRBC BLD AUTO-RTO: 0 /100 WBC (ref 0–0.2)
OPIATES UR QL: NEGATIVE
OXYCODONE UR QL SCN: NEGATIVE
PCP UR QL SCN: NEGATIVE
PLATELET # BLD AUTO: 171 10*3/MM3 (ref 140–450)
PMV BLD AUTO: 11.3 FL (ref 6–12)
POTASSIUM SERPL-SCNC: 4.8 MMOL/L (ref 3.5–5.2)
PROPOXYPH UR QL: NEGATIVE
PROT SERPL-MCNC: 6.6 G/DL (ref 6–8.5)
RBC # BLD AUTO: 4.26 10*6/MM3 (ref 3.77–5.28)
SODIUM SERPL-SCNC: 136 MMOL/L (ref 136–145)
T4 FREE SERPL-MCNC: 1.28 NG/DL (ref 0.93–1.7)
TRICYCLICS UR QL SCN: NEGATIVE
TRIGL SERPL-MCNC: 252 MG/DL (ref 0–150)
TSH SERPL DL<=0.05 MIU/L-ACNC: 1.41 UIU/ML (ref 0.27–4.2)
VIT B12 BLD-MCNC: 302 PG/ML (ref 211–946)
VLDLC SERPL-MCNC: 43 MG/DL (ref 5–40)
WBC NRBC COR # BLD: 4.54 10*3/MM3 (ref 3.4–10.8)

## 2022-10-04 PROCEDURE — 90471 IMMUNIZATION ADMIN: CPT | Performed by: PHYSICIAN ASSISTANT

## 2022-10-04 PROCEDURE — 90686 IIV4 VACC NO PRSV 0.5 ML IM: CPT | Performed by: PHYSICIAN ASSISTANT

## 2022-10-04 PROCEDURE — 82607 VITAMIN B-12: CPT

## 2022-10-04 PROCEDURE — 80061 LIPID PANEL: CPT | Performed by: PHYSICIAN ASSISTANT

## 2022-10-04 PROCEDURE — 80306 DRUG TEST PRSMV INSTRMNT: CPT

## 2022-10-04 PROCEDURE — 84439 ASSAY OF FREE THYROXINE: CPT

## 2022-10-04 PROCEDURE — 99396 PREV VISIT EST AGE 40-64: CPT | Performed by: PHYSICIAN ASSISTANT

## 2022-10-04 PROCEDURE — 80050 GENERAL HEALTH PANEL: CPT

## 2022-10-04 RX ORDER — ESCITALOPRAM OXALATE 10 MG/1
10 TABLET ORAL DAILY
Qty: 30 TABLET | Refills: 5 | Status: SHIPPED | OUTPATIENT
Start: 2022-10-04 | End: 2022-11-17

## 2022-10-04 RX ORDER — MONTELUKAST SODIUM 10 MG/1
10 TABLET ORAL NIGHTLY
Qty: 30 TABLET | Refills: 2 | Status: SHIPPED | OUTPATIENT
Start: 2022-10-04

## 2022-10-04 RX ORDER — ALBUTEROL SULFATE 90 UG/1
2 AEROSOL, METERED RESPIRATORY (INHALATION) EVERY 4 HOURS PRN
Qty: 8.5 G | Refills: 5 | Status: SHIPPED | OUTPATIENT
Start: 2022-10-04

## 2022-10-04 RX ORDER — FLUTICASONE PROPIONATE AND SALMETEROL 500; 50 UG/1; UG/1
1 POWDER RESPIRATORY (INHALATION) 2 TIMES DAILY
Qty: 60 EACH | Refills: 0 | Status: SHIPPED | OUTPATIENT
Start: 2022-10-04 | End: 2023-03-16 | Stop reason: SDUPTHER

## 2022-10-04 NOTE — PROGRESS NOTES
Answers for HPI/ROS submitted by the patient on 10/4/2022  Please describe your symptoms.: Annual, prescription refill.  Have you had these symptoms before?: No  How long have you been having these symptoms?: 1-4 days  Please list any medications you are currently taking for this condition.: Allergy medicine, anxiety medicine.  What is the primary reason for your visit?: Other    [facility]    Alicia Diaz  1980   [id number]      Patient Care Team:  Curry Gill MD as PCP - General (Internal Medicine)  Mariam Kim DO as Consulting Physician (Obstetrics and Gynecology)    Chief Complaint::   Chief Complaint   Patient presents with   • Annual Exam     With PAP        HPI  The patient is a 42-year-old female, who presents today with for annual physical with Pap smear. Past medical history significant for anxiety, depression, asthma, and insomnia.     General  The patient mentions having undergone a mammaplasty on 2022 and notes that the area is still a little swollen. She states that she will have her follow-up with her surgeon next week. She claims history of having 1  section and 1 normal vaginal delivery in the past. She reports having had an intrauterine contraceptive device, Mirena, for approximately 3 years now and is unsure of the date of her last menstrual cycle. She confirms of having no real menstruations. She denies doing self-breast examinations at home. The patient mentions having a mammogram and a series of blood tests before she had her breast reduction surgery. She denies any pain during intercourse or abnormal discharges or bleeding. She confirms her bowel movements are normal. The patient states that she takes cranberry supplements and mentions having had a history of urinary tract infections in the past.     The patient reports still seeing Dr. Stone, and she has an appointment with them on 10/05/2022.     Health maintenance  The patient's  "mammogram is up to date. She denies any history of abnormal Pap smear results.     Insomnia  The patient confirms taking alprazolam to help her sleep sometimes but not regularly because it causes her to feel tired a day later.    Depression with anxiety  The patient confirms regularly taking her prescribed medication, Lexapro, and requests a refill.    Asthma  The patient confirms having both Advair and Spiriva Respimat prescribed by Dr. Gill. She requests a refill for Advair. She mentions taking Claritin as an over-the-counter medication. She reports having asthma flare-ups approximately 3-4 times a year and notes that she typically has them during the spring and fall seasons.    Allergies  The patient mentions that after having kids, she noticed that she suddenly had allergies to bugs, especially bees, which she describes as \"bizarre\". She reports having taken steroid medications for it. She mentions the bug bite incident happened on 2022. She claims to be currently taking steroids due to a spider bite a few weeks ago.    Vaccination  The patient is up to date with her tetanus and pertussis vaccinations. She states of not having influenza vaccine yet and requests for one.     Patient Active Problem List   Diagnosis   • S/P emergency  section   • Asthma   • Anxiety   • Insomnia   • Irritable bowel syndrome   • Acute bacterial rhinosinusitis   • Allergic rhinitis   • Depression   • Pharyngitis   • Asthmatic bronchitis   • Lipid screening   • Encounter for vitamin deficiency screening   • Routine medical exam        Past Medical History:   Diagnosis Date   • Allergy    • Anxiety    • Asthma    • Asthma    • Cancer (HCC)     squamous upper abd and back   • Family history of asthma    • IBS (irritable bowel syndrome)    • Irritable bowel syndrome    • Wears eyeglasses        Past Surgical History:   Procedure Laterality Date   •  SECTION Bilateral 2018    Procedure:  SECTION " PRIMARY;  Surgeon: Mariam Kim DO;  [facility];  Service:    • MASTOPEXY Bilateral 8/22/2022    Procedure: MASTOPEXY- BILATERAL;  Surgeon: Jared Tavarez MD;  [facility];  Service: Plastics;  Laterality: Bilateral;   • SKIN BIOPSY     • WIDE EXCISION LESION/MASS TRUNK      back and upper abd   • WISDOM TOOTH EXTRACTION         Family History   Problem Relation Age of Onset   • Diabetes Paternal Grandmother    • Asthma Other    • Breast cancer Neg Hx    • Endometrial cancer Neg Hx    • Ovarian cancer Neg Hx        Social History     Socioeconomic History   • Marital status:    Tobacco Use   • Smoking status: Never Smoker   • Smokeless tobacco: Never Used   Vaping Use   • Vaping Use: Never used   Substance and Sexual Activity   • Alcohol use: Yes     Alcohol/week: 2.0 standard drinks     Types: 2 Glasses of wine per week   • Drug use: No   • Sexual activity: Yes     Partners: Male       Allergies   Allergen Reactions   • Bactrim [Sulfamethoxazole-Trimethoprim] Anaphylaxis, Hives, Rash and GI Intolerance     And fever        Review of Systems   Constitutional: Negative for activity change, appetite change, diaphoresis, fatigue, unexpected weight gain and unexpected weight loss.   HENT: Negative for hearing loss.    Eyes: Negative for visual disturbance.   Respiratory: Negative for chest tightness and shortness of breath.    Cardiovascular: Negative for chest pain, palpitations and leg swelling.   Gastrointestinal: Negative for abdominal pain, blood in stool, GERD and indigestion.   Endocrine: Negative for cold intolerance and heat intolerance.   Genitourinary: Negative for breast discharge, breast lump, breast pain, dysuria, hematuria, menstrual problem, pelvic pain and pelvic pressure.   Musculoskeletal: Negative for arthralgias and myalgias.   Skin: Negative for skin lesions.   Neurological: Negative for tremors, seizures, syncope, speech difficulty, weakness, headache, memory  "problem and confusion.   Hematological: Does not bruise/bleed easily.   Psychiatric/Behavioral: Negative for sleep disturbance and depressed mood. The patient is not nervous/anxious.         Vital Signs  Vitals:    10/04/22 0838   BP: 116/82   BP Location: Left arm   Patient Position: Sitting   Cuff Size: Adult   Pulse: 65   Temp: 97.8 °F (36.6 °C)   TempSrc: Temporal   Weight: 67.1 kg (148 lb)   Height: 162.6 cm (64.02\")   PainSc: 0-No pain     Body mass index is 25.39 kg/m².  BMI is >= 25 and <30. (Overweight) The following options were offered after discussion;: weight loss educational material (shared in after visit summary), exercise counseling/recommendations and nutrition counseling/recommendations     Advance Care Planning       Current Outpatient Medications:   •  albuterol (PROVENTIL) (2.5 MG/3ML) 0.083% nebulizer solution, Take 2.5 mg by nebulization Every 4 (Four) Hours As Needed for Wheezing., Disp: , Rfl:   •  albuterol sulfate  (90 Base) MCG/ACT inhaler, Inhale 2 puffs Every 4 (Four) Hours As Needed for Wheezing., Disp: 8.5 g, Rfl: 5  •  escitalopram (LEXAPRO) 10 MG tablet, Take 1 tablet by mouth Daily., Disp: 30 tablet, Rfl: 5  •  Fluticasone-Salmeterol (Advair Diskus) 500-50 MCG/ACT DISKUS, Inhale 1 puff 2 (Two) Times a Day., Disp: 60 each, Rfl: 0  •  Levonorgestrel (MIRENA, 52 MG, IU), by Intrauterine route., Disp: , Rfl:   •  loratadine (CLARITIN) 10 MG tablet, Take 1 tablet by mouth Daily., Disp: , Rfl:   •  Spiriva Respimat 1.25 MCG/ACT aerosol solution inhaler, INHALE 2 PUFFS BY MOUTH DAILY (Patient taking differently: Inhale 2 puffs Daily.), Disp: 4 g, Rfl: 1  •  ALPRAZolam (XANAX) 0.5 MG tablet, TAKE 1 TABLET BY MOUTH TWICE DAILY AS NEEDED FOR ANXIETY, Disp: 30 tablet, Rfl: 2  •  montelukast (Singulair) 10 MG tablet, Take 1 tablet by mouth Every Night., Disp: 30 tablet, Rfl: 2    Physical Exam  Vitals reviewed. Exam conducted with a chaperone present.   Constitutional:       " Appearance: Normal appearance. She is well-developed.   HENT:      Head: Normocephalic and atraumatic.      Right Ear: Hearing, tympanic membrane, ear canal and external ear normal.      Left Ear: Hearing, tympanic membrane, ear canal and external ear normal.      Nose: Nose normal.      Mouth/Throat:      Pharynx: Uvula midline.   Eyes:      General: Lids are normal.      Conjunctiva/sclera: Conjunctivae normal.      Pupils: Pupils are equal, round, and reactive to light.   Cardiovascular:      Rate and Rhythm: Normal rate and regular rhythm.      Heart sounds: Normal heart sounds.   Pulmonary:      Effort: Pulmonary effort is normal.      Breath sounds: Normal breath sounds.   Chest:   Breasts:      Right: No swelling, nipple discharge or tenderness.      Left: No swelling, nipple discharge or tenderness.            Comments: Recent breast reduction.  Thickness at incision.  No drainage or erythema noted.  Abdominal:      General: Bowel sounds are normal.      Palpations: Abdomen is soft.   Genitourinary:     Vagina: Normal.      Cervix: Normal.      Uterus: Normal.       Adnexa: Right adnexa normal and left adnexa normal.   Musculoskeletal:         General: Normal range of motion.      Cervical back: Full passive range of motion without pain, normal range of motion and neck supple.   Skin:     General: Skin is warm and dry.   Neurological:      Mental Status: She is alert and oriented to person, place, and time.      Deep Tendon Reflexes: Reflexes are normal and symmetric.   Psychiatric:         Speech: Speech normal.         Behavior: Behavior normal.         Thought Content: Thought content normal.         Judgment: Judgment normal.        HEENT: Pupils equal reactive ENT clear, no facial asymmetry, pharynx is clear  NECK:  No masses bruit or thyromegaly  CHEST: Clear without rales wheezes or rhonchi  CARDIAC: Regular rhythm without gallop rub or click, blood pressure heart rate stable.   ABD: Liver spleen  normal  : Deferred  NEURO: Intact   PSYCH: Normal  EXTREM: No significant arthritic changes, no edema.   SKIN: Clear  ACE III MINI            Results Review:    Recent Results (from the past 672 hour(s))   Lipid Panel    Collection Time: 10/04/22  9:30 AM    Specimen: Blood   Result Value Ref Range    Total Cholesterol 232 (H) 0 - 200 mg/dL    Triglycerides 252 (H) 0 - 150 mg/dL    HDL Cholesterol 74 (H) 40 - 60 mg/dL    LDL Cholesterol  115 (H) 0 - 100 mg/dL    VLDL Cholesterol 43 (H) 5 - 40 mg/dL    LDL/HDL Ratio 1.45    LIQUID-BASED PAP SMEAR, [facility] ([facility])    Collection Time: 10/04/22  9:33 AM    Specimen: ThinPrep Vial   Result Value Ref Range    Reference Lab Report       [facility]  [address] [place] [address]  Phone: [phone] or [phone]  Fax: [phone]  Vasu Alberts M.D., Medical Director    PATIENT NAME                                     LABORATORY NO.  [id number]   OENL MARTINI.                            [id number]  [id number]                                 AGE                    SEX   SSN              CLIENT REF #  [facility]                     42        1980      F     [id number]      [id number]    [address] [facility] [address]            REQUESTING M.D.           ATTENDING [place] [address]                        [address]  DATE COLLECTED            DATE RECEIVED          DATE REPORTED  10/04/2022                10/04/2022             10/05/2022    ThinPrep Pap with [facility]    DIAGNOSIS:  Negative for intraepithelial lesion or malignancy    Multiple factors can influence accuracy of Pap tests; therefore, screening at  regular  intervals is necessary for early cancer detection.      SPECIMEN ADEQUACY:  SATISFACTORY FOR EVALUATION  Transformation zone is present.  SOURCE OF SPECIMEN:       CERVICAL  SLIDES:  1  CLINICAL HISTORY:  Routine medical exam      CYTOTECHNOLOGIST:              SDS, CT (ASCP)    CPT CODES:  81291     Comprehensive Metabolic Panel    Collection Time: 10/04/22  9:40 AM    Specimen: Blood   Result Value Ref Range    Glucose 94 65 - 99 mg/dL    BUN 15 6 - 20 mg/dL    Creatinine 0.82 0.57 - 1.00 mg/dL    Sodium 136 136 - 145 mmol/L    Potassium 4.8 3.5 - 5.2 mmol/L    Chloride 100 98 - 107 mmol/L    CO2 17.2 (L) 22.0 - 29.0 mmol/L    Calcium 9.7 8.6 - 10.5 mg/dL    Total Protein 6.6 6.0 - 8.5 g/dL    Albumin 4.40 3.50 - 5.20 g/dL    ALT (SGPT) 25 1 - 33 U/L    AST (SGOT) 24 1 - 32 U/L    Alkaline Phosphatase 43 39 - 117 U/L    Total Bilirubin 0.6 0.0 - 1.2 mg/dL    Globulin 2.2 gm/dL    A/G Ratio 2.0 g/dL    BUN/Creatinine Ratio 18.3 7.0 - 25.0    Anion Gap 18.8 (H) 5.0 - 15.0 mmol/L    eGFR 92.9 >60.0 mL/min/1.73   TSH    Collection Time: 10/04/22  9:40 AM    Specimen: Blood   Result Value Ref Range    TSH 1.410 0.270 - 4.200 uIU/mL   T4, Free    Collection Time: 10/04/22  9:40 AM    Specimen: Blood   Result Value Ref Range    Free T4 1.28 0.93 - 1.70 ng/dL   Vitamin B12    Collection Time: 10/04/22  9:40 AM    Specimen: Blood   Result Value Ref Range    Vitamin B-12 302 211 - 946 pg/mL   Urine Drug Screen - Urine, Clean Catch    Collection Time: 10/04/22  9:40 AM    Specimen: Urine, Clean Catch   Result Value Ref Range    THC, Screen, Urine Negative Negative    Phencyclidine (PCP), Urine Negative Negative    Cocaine Screen, Urine Negative Negative    Methamphetamine, Ur Negative Negative    Opiate Screen Negative Negative    Amphetamine Screen, Urine Negative Negative    Benzodiazepine Screen, Urine Negative Negative    Tricyclic Antidepressants Screen Negative Negative    Methadone Screen, Urine Negative Negative    Barbiturates Screen, Urine Negative Negative    Oxycodone Screen, Urine Negative Negative    Propoxyphene Screen Negative Negative    Buprenorphine, Screen, Urine Negative Negative   CBC Auto Differential    Collection Time: 10/04/22  9:40 AM    Specimen:  Blood   Result Value Ref Range    WBC 4.54 3.40 - 10.80 10*3/mm3    RBC 4.26 3.77 - 5.28 10*6/mm3    Hemoglobin 13.8 12.0 - 15.9 g/dL    Hematocrit 39.8 34.0 - 46.6 %    MCV 93.4 79.0 - 97.0 fL    MCH 32.4 26.6 - 33.0 pg    MCHC 34.7 31.5 - 35.7 g/dL    RDW 11.8 (L) 12.3 - 15.4 %    RDW-SD 40.2 37.0 - 54.0 fl    MPV 11.3 6.0 - 12.0 fL    Platelets 171 140 - 450 10*3/mm3    Neutrophil % 58.5 42.7 - 76.0 %    Lymphocyte % 27.8 19.6 - 45.3 %    Monocyte % 10.4 5.0 - 12.0 %    Eosinophil % 1.5 0.3 - 6.2 %    Basophil % 0.9 0.0 - 1.5 %    Immature Grans % 0.9 (H) 0.0 - 0.5 %    Neutrophils, Absolute 2.66 1.70 - 7.00 10*3/mm3    Lymphocytes, Absolute 1.26 0.70 - 3.10 10*3/mm3    Monocytes, Absolute 0.47 0.10 - 0.90 10*3/mm3    Eosinophils, Absolute 0.07 0.00 - 0.40 10*3/mm3    Basophils, Absolute 0.04 0.00 - 0.20 10*3/mm3    Immature Grans, Absolute 0.04 0.00 - 0.05 10*3/mm3    nRBC 0.0 0.0 - 0.2 /100 WBC     Procedures    Medication Review: Medications reviewed and noted    Social History     Socioeconomic History   • Marital status:    Tobacco Use   • Smoking status: Never Smoker   • Smokeless tobacco: Never Used   Vaping Use   • Vaping Use: Never used   Substance and Sexual Activity   • Alcohol use: Yes     Alcohol/week: 2.0 standard drinks     Types: 2 Glasses of wine per week   • Drug use: No   • Sexual activity: Yes     Partners: Male        Assessment/Plan:    Problem List Items Addressed This Visit        Cardiac and Vasculature    Lipid screening    Relevant Orders    Lipid Panel (Completed)       Endocrine and Metabolic    Encounter for vitamin deficiency screening    Relevant Orders    Vitamin B12 (Completed)       Health Encounters    Routine medical exam - Primary    Overview     Pap and pelvic completed today.  Breast exam complete.  Patient has had recent breast reduction she is due to follow-up with surgeon next week.  Order for fasting labs placed.  Flu shot today.         Relevant Orders    CBC &  Differential (Completed)    Comprehensive Metabolic Panel (Completed)    LIQUID-BASED PAP SMEAR, [facility]) (Completed)       Mental Health    Anxiety    Overview     Continue Lexapro 10 mg as directed.         Relevant Medications    escitalopram (LEXAPRO) 10 MG tablet    ALPRAZolam (XANAX) 0.5 MG tablet       Pulmonary and Pneumonias    Asthmatic bronchitis    Overview     Continue Advair 550 as directed.  I have added Singulair 10 mg to take daily.  May continue to use albuterol as needed.         Relevant Medications    albuterol (PROVENTIL) (2.5 MG/3ML) 0.083% nebulizer solution    loratadine (CLARITIN) 10 MG tablet    Spiriva Respimat 1.25 MCG/ACT aerosol solution inhaler    Fluticasone-Salmeterol (Advair Diskus) 500-50 MCG/ACT DISKUS    albuterol sulfate  (90 Base) MCG/ACT inhaler    montelukast (Singulair) 10 MG tablet      Other Visit Diagnoses     Thyroid disorder screening        Relevant Orders    TSH (Completed)    T4, Free (Completed)    T3, Free    High risk medication use        Relevant Orders    Urine Drug Screen - Urine, Clean Catch (Completed)           Patient Instructions   BMI for Adults  What is BMI?  Body mass index (BMI) is a number that is calculated from a person's weight and height. BMI can help estimate how much of a person's weight is composed of fat. BMI does not measure body fat directly. Rather, it is an alternative to procedures that directly measure body fat, which can be difficult and expensive.  BMI can help identify people who may be at higher risk for certain medical problems.  What are BMI measurements used for?  BMI is used as a screening tool to identify possible weight problems. It helps determine whether a person is obese, overweight, a healthy weight, or underweight.  BMI is useful for:  • Identifying a weight problem that may be related to a medical condition or may increase the risk for medical problems.  • Promoting changes, such as changes in diet  "and exercise, to help reach a healthy weight. BMI screening can be repeated to see if these changes are working.  How is BMI calculated?  BMI involves measuring your weight in relation to your height. Both height and weight are measured, and the BMI is calculated from those numbers. This can be done either in English (U.S[place] or metric measurements. Note that charts and online BMI calculators are available to help you find your BMI quickly and easily without having to do these calculations yourself.  To calculate your BMI in English (U.S.) measurements:    1. Measure your weight in pounds (lb).  2. Multiply the number of pounds by 703.  ? For example, for a person who weighs 180 lb, multiply that number by 703, which equals 126,540.  3. Measure your height in inches. Then multiply that number by itself to get a measurement called \"inches squared.\"  ? For example, for a person who is 70 inches tall, the \"inches squared\" measurement is 70 inches x 70 inches, which equals 4,900 inches squared.  4. Divide the total from step 2 (number of lb x 703) by the total from step 3 (inches squared): 126,540 ÷ 4,900 = 25.8. This is your BMI.  To calculate your BMI in metric measurements:  1. Measure your weight in kilograms (kg).  2. Measure your height in meters (m). Then multiply that number by itself to get a measurement called \"meters squared.\"  ? For example, for a person who is 1.75 m tall, the \"meters squared\" measurement is 1.75 m x 1.75 m, which is equal to 3.1 meters squared.  3. Divide the number of kilograms (your weight) by the meters squared number. In this example: 70 ÷ 3.1 = 22.6. This is your BMI.  What do the results mean?  BMI charts are used to identify whether you are underweight, normal weight, overweight, or obese. The following guidelines will be used:  • Underweight: BMI less than 18.5.  • Normal weight: BMI between 18.5 and 24.9.  • Overweight: BMI between 25 and 29.9.  • Obese: BMI of 30 or above.  Keep " these notes in mind:  • Weight includes both fat and muscle, so someone with a muscular build, such as an athlete, may have a BMI that is higher than 24.9. In cases like these, BMI is not an accurate measure of body fat.  • To determine if excess body fat is the cause of a BMI of 25 or higher, further assessments may need to be done by a health care provider.  • BMI is usually interpreted in the same way for men and women.  Where to find more information  For more information about BMI, including tools to quickly calculate your BMI, go to these websites:  • Centers for Disease Control and Prevention: www.cdc.gov  • American Heart Association: www.heart.org  • National Heart, Lung, and Blood Shiro: www.nhlbi.nih.gov  Summary  • Body mass index (BMI) is a number that is calculated from a person's weight and height.  • BMI may help estimate how much of a person's weight is composed of fat. BMI can help identify those who may be at higher risk for certain medical problems.  • BMI can be measured using English measurements or metric measurements.  • BMI charts are used to identify whether you are underweight, normal weight, overweight, or obese.  This information is not intended to replace advice given to you by your health care provider. Make sure you discuss any questions you have with your health care provider.  Document Revised: 09/09/2020 Document Reviewed: 07/17/2020  [employer] Patient Education © 2022 [employer]         Plan of care reviewed with patient at the conclusion of today's visit. Education was provided regarding diagnosis, management, and any prescribed or recommended OTC medications.Patient verbalizes understanding of and agreement with management plan.         I spent 32 minutes caring for Alicia on this date of service. This time includes time spent by me in the following activities:preparing for the visit, reviewing tests, obtaining and/or reviewing a separately obtained history, performing a  medically appropriate examination and/or evaluation , counseling and educating the patient/family/caregiver, ordering medications, tests, or procedures, referring and communicating with other health care professionals  and documenting information in the medical record    Silvia Pryor PA-C      Note: Part of this note may be an electronic transcription/translation of spoken language to printed text using the Dragon Dictation system.    Transcribed from ambient dictation for Silvia Pryor PA-C by Paige Acosta.  10/04/22   12:37 EDT    Patient verbalized consent to the visit recording.  I have personally performed the services described in this document as transcribed by the above individual, and it is both accurate and complete.  Silvia Pryor PA-C  10/6/2022  09:29 EDT

## 2022-10-05 DIAGNOSIS — F41.9 ANXIETY: ICD-10-CM

## 2022-10-05 LAB — REF LAB TEST METHOD: NORMAL

## 2022-10-05 RX ORDER — ALPRAZOLAM 0.5 MG/1
TABLET ORAL
Qty: 30 TABLET | Refills: 2 | Status: SHIPPED | OUTPATIENT
Start: 2022-10-05

## 2022-10-05 NOTE — TELEPHONE ENCOUNTER
Rx Refill Note  Requested Prescriptions     Pending Prescriptions Disp Refills   • ALPRAZolam (XANAX) 0.5 MG tablet [Pharmacy Med Name: ALPRAZOLAM 0.5MG TABLETS] 30 tablet      Sig: TAKE 1 TABLET BY MOUTH TWICE DAILY AS NEEDED FOR ANXIETY      Last refill:  2/10/22 #30/2  Last office visit with prescribing clinician: 7/13/2022      Next office visit with prescribing clinician: 10/9/23  {TIP  Encounters:23}         Lisa Martines RN  10/05/22, 10:34 EDT

## 2022-10-06 PROBLEM — Z00.00 ROUTINE MEDICAL EXAM: Status: ACTIVE | Noted: 2022-10-06

## 2022-10-06 PROBLEM — J45.909 ASTHMATIC BRONCHITIS: Status: ACTIVE | Noted: 2022-10-06

## 2022-10-06 PROBLEM — Z13.21 ENCOUNTER FOR VITAMIN DEFICIENCY SCREENING: Status: ACTIVE | Noted: 2022-10-06

## 2022-10-06 PROBLEM — Z13.220 LIPID SCREENING: Status: ACTIVE | Noted: 2022-10-06

## 2022-10-06 NOTE — PATIENT INSTRUCTIONS

## 2022-11-17 RX ORDER — FLUOXETINE 10 MG/1
10 CAPSULE ORAL DAILY
Qty: 30 CAPSULE | Refills: 2 | Status: SHIPPED | OUTPATIENT
Start: 2022-11-17

## 2022-11-28 RX ORDER — PROMETHAZINE HYDROCHLORIDE AND PHENYLEPHRINE HYDROCHLORIDE 6.25; 5 MG/5ML; MG/5ML
5 SYRUP ORAL EVERY 6 HOURS PRN
Qty: 180 ML | Refills: 0 | Status: SHIPPED | OUTPATIENT
Start: 2022-11-28

## 2023-03-09 ENCOUNTER — TELEPHONE (OUTPATIENT)
Dept: INTERNAL MEDICINE | Facility: CLINIC | Age: 43
End: 2023-03-09

## 2023-03-13 NOTE — TELEPHONE ENCOUNTER
Spoke to pharm at   [phone]. They said the reason the cost is so high ($344.07) is because she hasn't met her deductible. HOLLY for pt

## 2023-03-14 NOTE — TELEPHONE ENCOUNTER
Patient returning Kamila's call. She said her insurance changed this year. She wants to know if a PA was submitted and if so was it approved?If not approved, she wants to know if there is a generic of something comparable to Advair. She has been without medication for a few days and can tell a difference when she is not on it.

## 2023-03-14 NOTE — TELEPHONE ENCOUNTER
Called pt and advised PA was attempted. Explained pharm said it was due to the deductible not being met yet. She would like to  an rx to see where she can get the best deal. Rx ready for

## 2023-03-16 DIAGNOSIS — J45.41 MODERATE PERSISTENT ASTHMATIC BRONCHITIS WITH ACUTE EXACERBATION: ICD-10-CM

## 2023-03-16 RX ORDER — FLUTICASONE PROPIONATE AND SALMETEROL 500; 50 UG/1; UG/1
1 POWDER RESPIRATORY (INHALATION) 2 TIMES DAILY
Qty: 60 EACH | Refills: 1 | Status: SHIPPED | OUTPATIENT
Start: 2023-03-16

## 2023-04-10 RX ORDER — FLUOXETINE 10 MG/1
10 CAPSULE ORAL DAILY
Qty: 30 CAPSULE | Refills: 2 | Status: SHIPPED | OUTPATIENT
Start: 2023-04-10

## 2023-04-17 ENCOUNTER — TELEPHONE (OUTPATIENT)
Dept: INTERNAL MEDICINE | Facility: CLINIC | Age: 43
End: 2023-04-17

## 2023-04-17 DIAGNOSIS — F41.9 ANXIETY: ICD-10-CM

## 2023-04-17 RX ORDER — FLUOXETINE 10 MG/1
10 CAPSULE ORAL DAILY
Qty: 30 CAPSULE | Refills: 2 | Status: CANCELLED | OUTPATIENT
Start: 2023-04-17

## 2023-04-17 RX ORDER — ALPRAZOLAM 0.5 MG/1
0.5 TABLET ORAL 2 TIMES DAILY PRN
Qty: 30 TABLET | Refills: 2 | Status: CANCELLED | OUTPATIENT
Start: 2023-04-17

## 2023-04-17 RX ORDER — FLUOXETINE 10 MG/1
10 CAPSULE ORAL DAILY
Qty: 30 CAPSULE | Refills: 2 | OUTPATIENT
Start: 2023-04-17

## 2023-04-17 NOTE — TELEPHONE ENCOUNTER
Rx Refill Note  Requested Prescriptions     Pending Prescriptions Disp Refills   • ALPRAZolam (XANAX) 0.5 MG tablet 30 tablet 2     Sig: Take 1 tablet by mouth 2 (Two) Times a Day As Needed. for anxiety      Last office visit with prescribing clinician: 10/04/22  Next office visit with prescribing clinician: 10/09/2023    LA: 10/05/22 #30 2R                          Would you like a call back once the refill request has been completed: [] Yes [] No    If the office needs to give you a call back, can they leave a voicemail: [] Yes [] No    Toyin Ortega LPN  04/17/23, 14:18 EDT

## 2023-04-17 NOTE — TELEPHONE ENCOUNTER
Called and spoke with patient. She stated she does not need a refill. She still has that whole bottle. She stated she was just going through her list and refilling what stated needed to be filled. She is not taking it more than prescribed.

## 2023-04-17 NOTE — TELEPHONE ENCOUNTER
Has patient started taking alprazolam more frequently?  See [facility] results below.  Please check with patient.  I will defer to Dr. Roberts if he is taking it more frequently, that should be discussed with PCP.  Thanks    [facility] Patient Controlled Substance Report (from 4/17/2022 to 4/17/2023)    Dispensed  Strength Quantity Days Supply Provider Pharmacy   04/02/2023 Alprazolam 0.5MG 30 each [facility]   01/12/2023 Alprazolam 0.5MG 30 each [facility]   10/17/2022 Alprazolam 0.5MG 30 each [facility]   08/16/2022 Oxycodone Hydrochloride 5MG 12 each 2 [facility]   06/30/2022 Alprazolam 0.5MG 30 each [facility]

## 2023-05-30 ENCOUNTER — TELEPHONE (OUTPATIENT)
Dept: INTERNAL MEDICINE | Facility: CLINIC | Age: 43
End: 2023-05-30

## 2023-05-30 DIAGNOSIS — J45.41 MODERATE PERSISTENT ASTHMATIC BRONCHITIS WITH ACUTE EXACERBATION: ICD-10-CM

## 2023-05-30 DIAGNOSIS — F41.9 ANXIETY: ICD-10-CM

## 2023-05-30 RX ORDER — FLUTICASONE PROPIONATE AND SALMETEROL 500; 50 UG/1; UG/1
1 POWDER RESPIRATORY (INHALATION) 2 TIMES DAILY
Qty: 60 EACH | Refills: 1 | Status: SHIPPED | OUTPATIENT
Start: 2023-05-30 | End: 2023-05-30 | Stop reason: SDUPTHER

## 2023-05-30 RX ORDER — FLUTICASONE PROPIONATE AND SALMETEROL 500; 50 UG/1; UG/1
1 POWDER RESPIRATORY (INHALATION) 2 TIMES DAILY
Qty: 60 EACH | Refills: 1 | Status: SHIPPED | OUTPATIENT
Start: 2023-05-30 | End: 2023-05-31 | Stop reason: SDUPTHER

## 2023-05-30 NOTE — TELEPHONE ENCOUNTER
----- Message from Ailyn Linares LPN sent at 5/30/2023  9:36 AM EDT -----  Regarding: FW: Advair redill  Contact: [phone]    ----- Message -----  From: Alicia Diaz  Sent: 5/28/2023   8:34 AM EDT  To: Yoly Regan [facility] [address] Clinical Pool  Subject: Advair redill                                    I’ve been using that [facility] for my Advair. I am out of refills. Can you send it in again? Here is the info.   Pineda Qiu [facility]  E-Rx: NPI [id number], NCPDP [id number]

## 2023-05-31 DIAGNOSIS — J45.41 MODERATE PERSISTENT ASTHMATIC BRONCHITIS WITH ACUTE EXACERBATION: ICD-10-CM

## 2023-05-31 RX ORDER — ALPRAZOLAM 0.5 MG/1
0.5 TABLET ORAL 2 TIMES DAILY PRN
Qty: 30 TABLET | Refills: 2 | Status: SHIPPED | OUTPATIENT
Start: 2023-05-31

## 2023-05-31 RX ORDER — FLUTICASONE PROPIONATE AND SALMETEROL 500; 50 UG/1; UG/1
1 POWDER RESPIRATORY (INHALATION) 2 TIMES DAILY
Qty: 180 EACH | Refills: 3 | Status: SHIPPED | OUTPATIENT
Start: 2023-05-31

## 2023-06-01 ENCOUNTER — PRIOR AUTHORIZATION (OUTPATIENT)
Dept: INTERNAL MEDICINE | Facility: CLINIC | Age: 43
End: 2023-06-01

## 2023-10-02 DIAGNOSIS — J45.41 MODERATE PERSISTENT ASTHMATIC BRONCHITIS WITH ACUTE EXACERBATION: ICD-10-CM

## 2023-10-02 RX ORDER — FLUTICASONE PROPIONATE AND SALMETEROL 500; 50 UG/1; UG/1
1 POWDER RESPIRATORY (INHALATION) 2 TIMES DAILY
Qty: 180 EACH | Refills: 3 | Status: SHIPPED | OUTPATIENT
Start: 2023-10-02 | End: 2023-10-03 | Stop reason: SDUPTHER

## 2023-10-03 DIAGNOSIS — J45.41 MODERATE PERSISTENT ASTHMATIC BRONCHITIS WITH ACUTE EXACERBATION: ICD-10-CM

## 2023-10-03 RX ORDER — FLUTICASONE PROPIONATE AND SALMETEROL 500; 50 UG/1; UG/1
1 POWDER RESPIRATORY (INHALATION) 2 TIMES DAILY
Qty: 180 EACH | Refills: 3 | Status: SHIPPED | OUTPATIENT
Start: 2023-10-03 | End: 2023-10-09 | Stop reason: SDUPTHER

## 2023-10-09 ENCOUNTER — OFFICE VISIT (OUTPATIENT)
Dept: INTERNAL MEDICINE | Facility: CLINIC | Age: 43
End: 2023-10-09
Payer: COMMERCIAL

## 2023-10-09 VITALS
HEIGHT: 65 IN | SYSTOLIC BLOOD PRESSURE: 98 MMHG | HEART RATE: 78 BPM | WEIGHT: 124.8 LBS | TEMPERATURE: 98.2 F | BODY MASS INDEX: 20.79 KG/M2 | DIASTOLIC BLOOD PRESSURE: 70 MMHG | OXYGEN SATURATION: 98 %

## 2023-10-09 DIAGNOSIS — K58.1 IRRITABLE BOWEL SYNDROME WITH CONSTIPATION: ICD-10-CM

## 2023-10-09 DIAGNOSIS — Z00.00 ROUTINE MEDICAL EXAM: Primary | ICD-10-CM

## 2023-10-09 DIAGNOSIS — F41.9 ANXIETY: ICD-10-CM

## 2023-10-09 DIAGNOSIS — J45.40 MODERATE PERSISTENT ASTHMA WITHOUT COMPLICATION: ICD-10-CM

## 2023-10-09 DIAGNOSIS — J45.41 MODERATE PERSISTENT ASTHMATIC BRONCHITIS WITH ACUTE EXACERBATION: ICD-10-CM

## 2023-10-09 DIAGNOSIS — Z13.29 THYROID DISORDER SCREENING: ICD-10-CM

## 2023-10-09 DIAGNOSIS — Z13.220 LIPID SCREENING: ICD-10-CM

## 2023-10-09 DIAGNOSIS — Z13.21 ENCOUNTER FOR VITAMIN DEFICIENCY SCREENING: ICD-10-CM

## 2023-10-09 PROCEDURE — 90686 IIV4 VACC NO PRSV 0.5 ML IM: CPT | Performed by: PHYSICIAN ASSISTANT

## 2023-10-09 PROCEDURE — 90471 IMMUNIZATION ADMIN: CPT | Performed by: PHYSICIAN ASSISTANT

## 2023-10-09 PROCEDURE — 99396 PREV VISIT EST AGE 40-64: CPT | Performed by: PHYSICIAN ASSISTANT

## 2023-10-09 PROCEDURE — 99214 OFFICE O/P EST MOD 30 MIN: CPT | Performed by: PHYSICIAN ASSISTANT

## 2023-10-09 RX ORDER — FLUTICASONE PROPIONATE AND SALMETEROL 500; 50 UG/1; UG/1
1 POWDER RESPIRATORY (INHALATION) 2 TIMES DAILY
Qty: 180 EACH | Refills: 3 | Status: SHIPPED | OUTPATIENT
Start: 2023-10-09

## 2023-10-09 RX ORDER — ALBUTEROL SULFATE 90 UG/1
2 AEROSOL, METERED RESPIRATORY (INHALATION) EVERY 4 HOURS PRN
Qty: 8.5 G | Refills: 5 | Status: SHIPPED | OUTPATIENT
Start: 2023-10-09

## 2023-10-09 RX ORDER — MONTELUKAST SODIUM 10 MG/1
10 TABLET ORAL NIGHTLY
Qty: 90 TABLET | Refills: 1 | Status: SHIPPED | OUTPATIENT
Start: 2023-10-09

## 2023-10-09 NOTE — PROGRESS NOTES
[facility] Internal Medicine    Alicia Diaz  1980   [id number]      Patient Care Team:  Curry Gill MD as PCP - General (Internal Medicine)  Mariam Kim DO as Consulting Physician (Obstetrics and Gynecology)    Chief Complaint::   Chief Complaint   Patient presents with    Annual Exam          HPI  The patient is a 43-year-old female, who presents today with for annual physical with Pap smear. Past medical history significant for anxiety, depression, asthma, and insomnia.   She has 9 and 5 year old.   She had lost weight with semaglutide using [employer].  She reports starting this in December and stopping over the summer.  This is her lowest weight and she has been able to maintain.  She did have some increase in IBS issues while taking medication, but this has resolved since discontinuing.  She continues to workout regularly.   Breast reduction by Dr. Tavarez .  Last Pap was normal due for repeat in 2 years.      Patient Active Problem List   Diagnosis    S/P emergency  section    Asthma    Anxiety    Insomnia    Irritable bowel syndrome    Acute bacterial rhinosinusitis    Allergic rhinitis    Depression    Pharyngitis    Asthmatic bronchitis    Lipid screening    Encounter for vitamin deficiency screening    Routine medical exam        Past Medical History:   Diagnosis Date    Allergic     Allergy     Anxiety     Asthma     Asthma     Cancer     squamous upper abd and back    Family history of asthma     IBS (irritable bowel syndrome)     Irritable bowel syndrome     Wears eyeglasses        Past Surgical History:   Procedure Laterality Date    BREAST SURGERY  22     SECTION Bilateral 2018    Procedure:  SECTION PRIMARY;  Surgeon: Mariam Kim DO;  Location: [facility] LABOR DELIVERY;  Service:     MASTOPEXY Bilateral 2022    Procedure: MASTOPEXY- BILATERAL;  Surgeon: Jared Tavarez MD;  Location: [facility] OR;  Service: Plastics;   Laterality: Bilateral;    SKIN BIOPSY      WIDE EXCISION LESION/MASS TRUNK      back and upper abd    WISDOM TOOTH EXTRACTION         Family History   Problem Relation Age of Onset    Diabetes Paternal Grandmother     Asthma Other     Breast cancer Neg Hx     Endometrial cancer Neg Hx     Ovarian cancer Neg Hx        Social History     Socioeconomic History    Marital status:    Tobacco Use    Smoking status: Never    Smokeless tobacco: Never   Vaping Use    Vaping Use: Never used   Substance and Sexual Activity    Alcohol use: Yes     Alcohol/week: 2.0 standard drinks of alcohol     Types: 2 Glasses of wine per week    Drug use: No    Sexual activity: Yes     Partners: Male     Birth control/protection: I.U.D.       Allergies   Allergen Reactions    Bactrim [Sulfamethoxazole-Trimethoprim] Anaphylaxis, Hives, Rash and GI Intolerance     And fever        Review of Systems   Constitutional:  Negative for activity change, appetite change, diaphoresis, fatigue, unexpected weight gain and unexpected weight loss.   HENT:  Negative for hearing loss.    Eyes:  Negative for visual disturbance.   Respiratory:  Negative for chest tightness and shortness of breath.    Cardiovascular:  Negative for chest pain, palpitations and leg swelling.   Gastrointestinal:  Negative for abdominal pain, blood in stool, GERD and indigestion.   Endocrine: Negative for cold intolerance and heat intolerance.   Genitourinary:  Negative for dysuria and hematuria.   Musculoskeletal:  Negative for arthralgias and myalgias.   Skin:  Negative for skin lesions.   Neurological:  Negative for tremors, seizures, syncope, speech difficulty, weakness, headache, memory problem and confusion.   Hematological:  Does not bruise/bleed easily.   Psychiatric/Behavioral:  Negative for sleep disturbance and depressed mood. The patient is not nervous/anxious.         Vital Signs  Vitals:    10/09/23 0836   BP: 98/70   BP Location: Left arm   Patient Position:  "Sitting   Cuff Size: Adult   Pulse: 78   Temp: 98.2 øF (36.8 øC)   TempSrc: Infrared   SpO2: 98%   Weight: 56.6 kg (124 lb 12.8 oz)   Height: 165.1 cm (65\")   PainSc: 0-No pain     Body mass index is 20.77 kg/mý.  BMI is within normal parameters. No other follow-up for BMI required.     Advance Care Planning   ACP discussion was held with the patient during this visit. Patient does not have an advance directive, information provided.       Current Outpatient Medications:     albuterol (PROVENTIL) (2.5 MG/3ML) 0.083% nebulizer solution, Take 2.5 mg by nebulization Every 4 (Four) Hours As Needed for Wheezing., Disp: , Rfl:     albuterol sulfate  (90 Base) MCG/ACT inhaler, Inhale 2 puffs Every 4 (Four) Hours As Needed for Wheezing., Disp: 8.5 g, Rfl: 5    ALPRAZolam (XANAX) 0.5 MG tablet, Take 1 tablet by mouth 2 (Two) Times a Day As Needed for Anxiety. for anxiety, Disp: 30 tablet, Rfl: 2    FLUoxetine (PROzac) 10 MG capsule, TAKE 1 CAPSULE BY MOUTH DAILY, Disp: 30 capsule, Rfl: 2    Fluticasone-Salmeterol (Advair Diskus) 500-50 MCG/ACT DISKUS, Inhale 1 puff 2 (Two) Times a Day., Disp: 180 each, Rfl: 3    Levonorgestrel (MIRENA, 52 MG, IU), by Intrauterine route., Disp: , Rfl:     loratadine (CLARITIN) 10 MG tablet, Take 1 tablet by mouth Daily., Disp: , Rfl:     montelukast (Singulair) 10 MG tablet, Take 1 tablet by mouth Every Night., Disp: 90 tablet, Rfl: 1    promethazine-phenylephrine 6.25-5 MG/5ML syrup syrup, Take 5 mL by mouth Every 6 (Six) Hours As Needed (cough)., Disp: 180 mL, Rfl: 0    Physical Exam  Vitals reviewed.   Constitutional:       Appearance: Normal appearance. She is well-developed.   HENT:      Head: Normocephalic and atraumatic.      Right Ear: Hearing, tympanic membrane, ear canal and external ear normal.      Left Ear: Hearing, tympanic membrane, ear canal and external ear normal.      Nose: Nose normal.      Mouth/Throat:      Pharynx: Uvula midline.   Eyes:      General: Lids are " normal.      Conjunctiva/sclera: Conjunctivae normal.      Pupils: Pupils are equal, round, and reactive to light.   Cardiovascular:      Rate and Rhythm: Normal rate and regular rhythm.      Heart sounds: Normal heart sounds.   Pulmonary:      Effort: Pulmonary effort is normal.      Breath sounds: Normal breath sounds. No wheezing or rales.   Abdominal:      General: Bowel sounds are normal.      Palpations: Abdomen is soft.   Musculoskeletal:         General: Normal range of motion.      Cervical back: Full passive range of motion without pain, normal range of motion and neck supple.   Skin:     General: Skin is warm and dry.   Neurological:      General: No focal deficit present.      Mental Status: She is alert and oriented to person, place, and time. Mental status is at baseline.      Deep Tendon Reflexes: Reflexes are normal and symmetric.   Psychiatric:         Speech: Speech normal.         Behavior: Behavior normal.         Thought Content: Thought content normal.         Judgment: Judgment normal.          ACE III MINI            Results Review:    No results found for this or any previous visit (from the past 672 hour(s)).  Procedures    Medication Review: Medications reviewed and noted    Social History     Socioeconomic History    Marital status:    Tobacco Use    Smoking status: Never    Smokeless tobacco: Never   Vaping Use    Vaping Use: Never used   Substance and Sexual Activity    Alcohol use: Yes     Alcohol/week: 2.0 standard drinks of alcohol     Types: 2 Glasses of wine per week    Drug use: No    Sexual activity: Yes     Partners: Male     Birth control/protection: I.U.D.        Assessment/Plan:    Diagnoses and all orders for this visit:    1. Routine medical exam (Primary)  Overview:  Order for fasting labs placed.  Flu shot today.  Due for Pap in 2 years.      2. Moderate persistent asthma without complication  Overview:  Continue Advair Diskus 500-50 twice daily, Spiriva, and  loratadine as directed.      3. Anxiety  Overview:  Continue Fluoxetine 10 mg as directed. Still uses alprazolam as needed. Usually takes a half.      4. Irritable bowel syndrome with constipation  Overview:  Takes fiber pills.      Orders:  -     CBC & Differential; Future  -     Comprehensive Metabolic Panel; Future    5. Thyroid disorder screening  -     TSH; Future  -     T4, Free; Future  -     T3, Free; Future    6. Encounter for vitamin deficiency screening  -     Vitamin B12; Future  -     Vitamin D,25-Hydroxy; Future    7. Lipid screening  -     Lipid Panel; Future    8. Moderate persistent asthmatic bronchitis with acute exacerbation  Overview:  Continue Advair 550 as directed.  I have added Singulair 10 mg to take daily.  May continue to use albuterol as needed.    Orders:  -     Fluticasone-Salmeterol (Advair Diskus) 500-50 MCG/ACT DISKUS; Inhale 1 puff 2 (Two) Times a Day.  Dispense: 180 each; Refill: 3  -     montelukast (Singulair) 10 MG tablet; Take 1 tablet by mouth Every Night.  Dispense: 90 tablet; Refill: 1  -     albuterol sulfate  (90 Base) MCG/ACT inhaler; Inhale 2 puffs Every 4 (Four) Hours As Needed for Wheezing.  Dispense: 8.5 g; Refill: 5    Other orders  -     Fluzone (or Fluarix & Flulaval for VFC) >6mos         There are no Patient Instructions on file for this visit.     Plan of care reviewed with patient at the conclusion of today's visit. Education was provided regarding diagnosis, management, and any prescribed or recommended OTC medications.Patient verbalizes understanding of and agreement with management plan.         I spent 30 minutes caring for Alicia on this date of service. This time includes time spent by me in the following activities:preparing for the visit, reviewing tests, obtaining and/or reviewing a separately obtained history, performing a medically appropriate examination and/or evaluation , counseling and educating the patient/family/caregiver, ordering  medications, tests, or procedures, referring and communicating with other health care professionals , and documenting information in the medical record    Silvia Pryor PA-C      Note: Part of this note may be an electronic transcription/translation of spoken language to printed text using the Dragon Dictation system.

## 2023-10-09 NOTE — PATIENT INSTRUCTIONS

## 2023-11-10 RX ORDER — METHYLPREDNISOLONE 4 MG/1
TABLET ORAL
Qty: 21 TABLET | Refills: 0 | Status: SHIPPED | OUTPATIENT
Start: 2023-11-10

## 2023-11-10 RX ORDER — AZITHROMYCIN 250 MG/1
TABLET, FILM COATED ORAL
Qty: 6 TABLET | Refills: 0 | Status: SHIPPED | OUTPATIENT
Start: 2023-11-10

## 2023-11-13 RX ORDER — BENZONATATE 100 MG/1
100 CAPSULE ORAL 3 TIMES DAILY PRN
Qty: 30 CAPSULE | Refills: 1 | Status: SHIPPED | OUTPATIENT
Start: 2023-11-13

## 2024-01-03 DIAGNOSIS — F41.9 ANXIETY: ICD-10-CM

## 2024-01-03 RX ORDER — ALPRAZOLAM 0.5 MG/1
0.5 TABLET ORAL 2 TIMES DAILY PRN
Qty: 30 TABLET | OUTPATIENT
Start: 2024-01-03

## 2024-01-03 RX ORDER — ALPRAZOLAM 0.5 MG/1
0.5 TABLET ORAL 2 TIMES DAILY PRN
Qty: 30 TABLET | Refills: 2 | Status: SHIPPED | OUTPATIENT
Start: 2024-01-03

## 2024-01-03 NOTE — TELEPHONE ENCOUNTER
Rx Refill Note  Requested Prescriptions     Pending Prescriptions Disp Refills    ALPRAZolam (XANAX) 0.5 MG tablet [Pharmacy Med Name: ALPRAZOLAM 0.5 MG TABLET] 30 tablet      Sig: TAKE 1 TABLET BY MOUTH TWICE A DAY AS NEEDED FOR ANXIETY      Last office visit with prescribing clinician: 10/09/23   Last telemedicine visit with prescribing clinician: Visit date not found   Next office visit with prescribing clinician: 10/10/24     LA: 06/29/23 #30 2R                          Would you like a call back once the refill request has been completed: [] Yes [] No    If the office needs to give you a call back, can they leave a voicemail: [] Yes [] No    Toyin Ortega LPN  01/03/24, 09:26 EST

## 2024-04-16 DIAGNOSIS — J45.41 MODERATE PERSISTENT ASTHMATIC BRONCHITIS WITH ACUTE EXACERBATION: ICD-10-CM

## 2024-04-16 RX ORDER — MONTELUKAST SODIUM 10 MG/1
10 TABLET ORAL NIGHTLY
Qty: 90 TABLET | Refills: 1 | Status: SHIPPED | OUTPATIENT
Start: 2024-04-16 | End: 2024-04-16 | Stop reason: SDUPTHER

## 2024-04-16 RX ORDER — MONTELUKAST SODIUM 10 MG/1
10 TABLET ORAL NIGHTLY
Qty: 90 TABLET | Refills: 1 | Status: SHIPPED | OUTPATIENT
Start: 2024-04-16

## 2024-04-16 RX ORDER — FLUOXETINE 10 MG/1
10 CAPSULE ORAL DAILY
Qty: 90 CAPSULE | Refills: 1 | Status: SHIPPED | OUTPATIENT
Start: 2024-04-16

## 2024-04-16 RX ORDER — FLUOXETINE 10 MG/1
10 CAPSULE ORAL DAILY
Qty: 30 CAPSULE | Refills: 5 | Status: SHIPPED | OUTPATIENT
Start: 2024-04-16 | End: 2024-04-16 | Stop reason: SDUPTHER

## 2024-04-16 NOTE — TELEPHONE ENCOUNTER
Rx Refill Note  Requested Prescriptions     Pending Prescriptions Disp Refills    FLUoxetine (PROzac) 10 MG capsule 30 capsule 2     Sig: Take 1 capsule by mouth Daily.      Last office visit with prescribing clinician: 7/13/2022   Last telemedicine visit with prescribing clinician: Visit date not found   Next office visit with prescribing clinician: 10/10/2024                         Would you like a call back once the refill request has been completed: [] Yes [] No    If the office needs to give you a call back, can they leave a voicemail: [] Yes [] No    Abby Martin MA  04/16/24, 09:00 EDT

## 2024-07-27 DIAGNOSIS — F41.9 ANXIETY: ICD-10-CM

## 2024-07-29 RX ORDER — ALPRAZOLAM 0.5 MG/1
0.5 TABLET ORAL 2 TIMES DAILY PRN
Qty: 30 TABLET | Refills: 2 | Status: SHIPPED | OUTPATIENT
Start: 2024-07-29

## 2024-07-29 NOTE — TELEPHONE ENCOUNTER
Rx Refill Note  Requested Prescriptions     Pending Prescriptions Disp Refills    ALPRAZolam (XANAX) 0.5 MG tablet [Pharmacy Med Name: ALPRAZOLAM 0.5 MG TABLET] 30 tablet      Sig: TAKE 1 TABLET BY MOUTH TWICE A DAY AS NEEDED FOR ANXIETY      Last refill:  1/3/24 #30/2  Last office visit with prescribing clinician: 10/9/23  Last telemedicine visit with prescribing clinician: Visit date not found   Next office visit with prescribing clinician: 10/8/24                        Would you like a call back once the refill request has been completed: [] Yes [] No    If the office needs to give you a call back, can they leave a voicemail: [] Yes [] No    Lisa Martines RN  07/29/24, 08:55 EDT

## 2024-11-01 ENCOUNTER — PATIENT MESSAGE (OUTPATIENT)
Dept: INTERNAL MEDICINE | Facility: CLINIC | Age: 44
End: 2024-11-01
Payer: COMMERCIAL

## 2024-11-01 DIAGNOSIS — J45.41 MODERATE PERSISTENT ASTHMATIC BRONCHITIS WITH ACUTE EXACERBATION: ICD-10-CM

## 2024-11-01 RX ORDER — FLUTICASONE PROPIONATE AND SALMETEROL 500; 50 UG/1; UG/1
1 POWDER RESPIRATORY (INHALATION) 2 TIMES DAILY
Qty: 180 EACH | Refills: 3 | Status: SHIPPED | OUTPATIENT
Start: 2024-11-01

## 2024-11-26 DIAGNOSIS — J45.41 MODERATE PERSISTENT ASTHMATIC BRONCHITIS WITH ACUTE EXACERBATION: ICD-10-CM

## 2024-11-26 DIAGNOSIS — F41.9 ANXIETY: ICD-10-CM

## 2024-11-26 RX ORDER — MONTELUKAST SODIUM 10 MG/1
10 TABLET ORAL NIGHTLY
Qty: 90 TABLET | Refills: 1 | Status: SHIPPED | OUTPATIENT
Start: 2024-11-26

## 2024-11-26 RX ORDER — FLUOXETINE 10 MG/1
10 CAPSULE ORAL DAILY
Qty: 90 CAPSULE | Refills: 1 | Status: SHIPPED | OUTPATIENT
Start: 2024-11-26

## 2024-11-26 NOTE — TELEPHONE ENCOUNTER
Rx Refill Note  Requested Prescriptions     Pending Prescriptions Disp Refills    ALPRAZolam (XANAX) 0.5 MG tablet 30 tablet 2     Sig: Take 1 tablet by mouth 2 (Two) Times a Day As Needed for Anxiety.      Last office visit with prescribing clinician: 10/9/23   Last telemedicine visit with prescribing clinician: Visit date not found   Next office visit with prescribing clinician: 1/6/25    LA: 7/29/24 #30 2R                        Would you like a call back once the refill request has been completed: [] Yes [] No    If the office needs to give you a call back, can they leave a voicemail: [] Yes [] No    Elva Chaney MA  11/26/24, 13:57 EST

## 2024-11-27 RX ORDER — ALPRAZOLAM 0.5 MG
0.5 TABLET ORAL 2 TIMES DAILY PRN
Qty: 30 TABLET | Refills: 2 | Status: SHIPPED | OUTPATIENT
Start: 2024-11-27

## 2025-03-03 ENCOUNTER — OFFICE VISIT (OUTPATIENT)
Dept: INTERNAL MEDICINE | Facility: CLINIC | Age: 45
End: 2025-03-03
Payer: COMMERCIAL

## 2025-03-03 VITALS
OXYGEN SATURATION: 99 % | TEMPERATURE: 98.5 F | SYSTOLIC BLOOD PRESSURE: 110 MMHG | DIASTOLIC BLOOD PRESSURE: 68 MMHG | WEIGHT: 122 LBS | HEIGHT: 65 IN | HEART RATE: 77 BPM | BODY MASS INDEX: 20.33 KG/M2

## 2025-03-03 DIAGNOSIS — F41.9 ANXIETY: ICD-10-CM

## 2025-03-03 DIAGNOSIS — Z13.29 THYROID DISORDER SCREENING: ICD-10-CM

## 2025-03-03 DIAGNOSIS — R09.81 NASAL CONGESTION: Primary | ICD-10-CM

## 2025-03-03 DIAGNOSIS — Z13.21 ENCOUNTER FOR VITAMIN DEFICIENCY SCREENING: ICD-10-CM

## 2025-03-03 DIAGNOSIS — J40 BRONCHITIS: ICD-10-CM

## 2025-03-03 DIAGNOSIS — J45.41 MODERATE PERSISTENT ASTHMATIC BRONCHITIS WITH ACUTE EXACERBATION: ICD-10-CM

## 2025-03-03 DIAGNOSIS — Z13.220 LIPID SCREENING: ICD-10-CM

## 2025-03-03 LAB
EXPIRATION DATE: NORMAL
FLUAV AG UPPER RESP QL IA.RAPID: NOT DETECTED
FLUBV AG UPPER RESP QL IA.RAPID: NOT DETECTED
INTERNAL CONTROL: NORMAL
Lab: NORMAL
SARS-COV-2 AG UPPER RESP QL IA.RAPID: NOT DETECTED

## 2025-03-03 PROCEDURE — 87428 SARSCOV & INF VIR A&B AG IA: CPT | Performed by: PHYSICIAN ASSISTANT

## 2025-03-03 PROCEDURE — 99214 OFFICE O/P EST MOD 30 MIN: CPT | Performed by: PHYSICIAN ASSISTANT

## 2025-03-03 RX ORDER — ALPRAZOLAM 0.5 MG
0.5 TABLET ORAL 2 TIMES DAILY PRN
Qty: 30 TABLET | Refills: 2 | Status: SHIPPED | OUTPATIENT
Start: 2025-03-03

## 2025-03-03 RX ORDER — METHYLPREDNISOLONE 4 MG/1
TABLET ORAL
Qty: 21 EACH | Refills: 0 | Status: SHIPPED | OUTPATIENT
Start: 2025-03-03 | End: 2025-03-07

## 2025-03-03 RX ORDER — BENZONATATE 200 MG/1
200 CAPSULE ORAL NIGHTLY
Qty: 30 CAPSULE | Refills: 2 | Status: SHIPPED | OUTPATIENT
Start: 2025-03-03

## 2025-03-03 RX ORDER — MONTELUKAST SODIUM 10 MG/1
10 TABLET ORAL NIGHTLY
Qty: 90 TABLET | Refills: 1 | Status: SHIPPED | OUTPATIENT
Start: 2025-03-03

## 2025-03-03 RX ORDER — DOXYCYCLINE 100 MG/1
100 CAPSULE ORAL 2 TIMES DAILY
Qty: 20 CAPSULE | Refills: 0 | Status: SHIPPED | OUTPATIENT
Start: 2025-03-03

## 2025-03-03 RX ORDER — FLUOXETINE 10 MG/1
10 CAPSULE ORAL DAILY
Qty: 90 CAPSULE | Refills: 1 | Status: SHIPPED | OUTPATIENT
Start: 2025-03-03

## 2025-03-03 NOTE — PROGRESS NOTES
Office Note     Name: Alicia Diaz    : 1980     MRN: [id number]     Chief Complaint  Med Refill, Cough, Nasal Congestion, and Annual Exam    Subjective     History of Present Illness:  Alicia Diaz is a 44 y.o. female who presents today for cough, nasal congestion.    History of Present Illness  The patient presents for evaluation of bronchitis, anxiety, and asthma.    She reports a persistent cough that has been present since Friday, which she attributes to recent influenza-like illness in her children. She experienced chills on Friday but has not had a fever since then. She is not experiencing any chest pain, shortness of breath, or wheezing. She woke up at 4:00 AM with severe coughing, which has slightly improved as the day progressed. She is concerned about the potential impact of her current symptoms on her asthma. She has a history of annual episodes of bronchitis, typically associated with her asthma. She recalls having a yeast infection during her pregnancy. Despite using Mucinex, her symptoms have not significantly improved. She has been using her inhaler more frequently than usual.    She is seeking a refill of her Xanax prescription, which she uses sparingly, typically taking half a pill at a time.    She is currently on Prozac 10 mg and Singulair, and is uncertain about the number of refills remaining for these medications.    Supplemental Information  She has lost weight since her last visit.    FAMILY HISTORY  Her mother is experiencing hematochezia and is due for a colonoscopy in a couple of weeks.    ALLERGIES  The patient is allergic to BACTRIM.    MEDICATIONS  Current: Xanax, Prozac, Singulair, Mucinex    Review of Systems:   Review of Systems   Constitutional:  Positive for chills. Negative for fever.   HENT:  Positive for sore throat.    Respiratory:  Positive for cough, shortness of breath and wheezing.    Allergic/Immunologic: Positive for environmental allergies.        Past Medical History:   Past Medical History:   Diagnosis Date    Allergic     Allergy     Anxiety     Asthma     Asthma     Cancer     squamous upper abd and back    Family history of asthma     IBS (irritable bowel syndrome)     Irritable bowel syndrome     Wears eyeglasses        Past Surgical History:   Past Surgical History:   Procedure Laterality Date    BREAST SURGERY  22     SECTION Bilateral 2018    Procedure:  SECTION PRIMARY;  Surgeon: Mariam Kim DO;  Location: [facility] LABOR DELIVERY;  Service:     MASTOPEXY Bilateral 2022    Procedure: MASTOPEXY- BILATERAL;  Surgeon: Jared Tavarez MD;  Location: [facility] OR;  Service: Plastics;  Laterality: Bilateral;    SKIN BIOPSY      WIDE EXCISION LESION/MASS TRUNK      back and upper abd    WISDOM TOOTH EXTRACTION         Family History:   Family History   Problem Relation Age of Onset    Diabetes Paternal Grandmother     Asthma Other     Breast cancer Neg Hx     Endometrial cancer Neg Hx     Ovarian cancer Neg Hx        Social History:   Social History     Socioeconomic History    Marital status:    Tobacco Use    Smoking status: Never    Smokeless tobacco: Never   Vaping Use    Vaping status: Never Used   Substance and Sexual Activity    Alcohol use: Yes     Alcohol/week: 2.0 standard drinks of alcohol     Types: 2 Glasses of wine per week    Drug use: No    Sexual activity: Yes     Partners: Male     Birth control/protection: I.U.D.       Immunizations:   Immunization History   Administered Date(s) Administered    COVID-19 (PFIZER) Purple Cap Monovalent 2021, 2021, 2021    Covid-19 (Pfizer) Gray Cap Monovalent 2022    Flu Vaccine Quad PF >36MO 2016    Fluzone (or Fluarix & Flulaval for VFC) >6mos 2020, 2021, 10/04/2022, 10/09/2023    Pneumococcal Polysaccharide (PPSV23) 2018    Tdap 09/15/2014        Medications:     Current Outpatient Medications:     albuterol  "(PROVENTIL) (2.5 MG/3ML) 0.083% nebulizer solution, Take 2.5 mg by nebulization Every 4 (Four) Hours As Needed for Wheezing., Disp: , Rfl:     albuterol sulfate  (90 Base) MCG/ACT inhaler, Inhale 2 puffs Every 4 (Four) Hours As Needed for Wheezing., Disp: 8.5 g, Rfl: 5    ALPRAZolam (XANAX) 0.5 MG tablet, Take 1 tablet by mouth 2 (Two) Times a Day As Needed for Anxiety., Disp: 30 tablet, Rfl: 2    FLUoxetine (PROzac) 10 MG capsule, Take 1 capsule by mouth Daily., Disp: 90 capsule, Rfl: 1    Fluticasone-Salmeterol (Advair Diskus) 500-50 MCG/ACT DISKUS, Inhale 1 puff 2 (Two) Times a Day., Disp: 180 each, Rfl: 3    Levonorgestrel (MIRENA, 52 MG, IU), by Intrauterine route., Disp: , Rfl:     loratadine (CLARITIN) 10 MG tablet, Take 1 tablet by mouth Daily., Disp: , Rfl:     montelukast (Singulair) 10 MG tablet, Take 1 tablet by mouth Every Night., Disp: 90 tablet, Rfl: 1    benzonatate (TESSALON) 200 MG capsule, Take 1 capsule by mouth Every Night., Disp: 30 capsule, Rfl: 2    doxycycline (MONODOX) 100 MG capsule, Take 1 capsule by mouth 2 (Two) Times a Day., Disp: 20 capsule, Rfl: 0    methylPREDNISolone (MEDROL) 4 MG dose pack, Take as directed on package instructions., Disp: 21 each, Rfl: 0    Allergies:   Allergies   Allergen Reactions    Bactrim [Sulfamethoxazole-Trimethoprim] Anaphylaxis, Hives, Rash and GI Intolerance     And fever        Objective     Vital Signs  /68 (BP Location: Right arm, Patient Position: Sitting, Cuff Size: Adult)   Pulse 77   Temp 98.5 °F (36.9 °C) (Temporal)   Ht 165.1 cm (65\")   Wt 55.3 kg (122 lb)   SpO2 99%   BMI 20.30 kg/m²   Body mass index is 20.3 kg/m².     BMI is within normal parameters. No other follow-up for BMI required.       Physical Exam  Vitals reviewed.   Constitutional:       Appearance: Normal appearance. She is well-developed.   HENT:      Head: Normocephalic and atraumatic.      Right Ear: Hearing, tympanic membrane, ear canal and external ear " normal.      Left Ear: Hearing, tympanic membrane, ear canal and external ear normal.      Nose: Nose normal.      Mouth/Throat:      Pharynx: Uvula midline.   Eyes:      General: Lids are normal.      Conjunctiva/sclera: Conjunctivae normal.      Pupils: Pupils are equal, round, and reactive to light.   Cardiovascular:      Rate and Rhythm: Normal rate and regular rhythm.      Heart sounds: Normal heart sounds.   Pulmonary:      Effort: Pulmonary effort is normal.      Breath sounds: Normal breath sounds. No wheezing.      Comments: Coarse breath sounds  Abdominal:      General: Bowel sounds are normal.      Palpations: Abdomen is soft.   Musculoskeletal:         General: Normal range of motion.      Cervical back: Full passive range of motion without pain, normal range of motion and neck supple.   Skin:     General: Skin is warm and dry.   Neurological:      Mental Status: She is alert and oriented to person, place, and time.      Deep Tendon Reflexes: Reflexes are normal and symmetric.   Psychiatric:         Speech: Speech normal.         Behavior: Behavior normal.         Thought Content: Thought content normal.         Judgment: Judgment normal.        Procedures     Results:  No results found for this or any previous visit (from the past 24 hours).     Assessment and Plan     Assessment/Plan:  Diagnoses and all orders for this visit:    1. Nasal congestion (Primary)  -     POCT SARS-CoV-2 + Flu Antigen SISSY    2. Bronchitis  -     doxycycline (MONODOX) 100 MG capsule; Take 1 capsule by mouth 2 (Two) Times a Day.  Dispense: 20 capsule; Refill: 0  -     methylPREDNISolone (MEDROL) 4 MG dose pack; Take as directed on package instructions.  Dispense: 21 each; Refill: 0  -     benzonatate (TESSALON) 200 MG capsule; Take 1 capsule by mouth Every Night.  Dispense: 30 capsule; Refill: 2    3. Anxiety  Overview:  Continue Fluoxetine 10 mg as directed. Still uses alprazolam as needed. Usually takes a half.    Orders:  -      ALPRAZolam (XANAX) 0.5 MG tablet; Take 1 tablet by mouth 2 (Two) Times a Day As Needed for Anxiety.  Dispense: 30 tablet; Refill: 2  -     Urine Drug Screen - Urine, Clean Catch; Future    4. Moderate persistent asthmatic bronchitis with acute exacerbation  Overview:  Continue Advair 550 as directed.  I have added Singulair 10 mg to take daily.  May continue to use albuterol as needed.    Orders:  -     montelukast (Singulair) 10 MG tablet; Take 1 tablet by mouth Every Night.  Dispense: 90 tablet; Refill: 1  -     CBC & Differential; Future  -     Comprehensive Metabolic Panel; Future    5. Thyroid disorder screening  -     TSH; Future  -     T4, Free; Future  -     T3, Free; Future    6. Encounter for vitamin deficiency screening  -     Vitamin B12; Future  -     Vitamin D,25-Hydroxy; Future    7. Lipid screening  -     Lipid Panel; Future    Other orders  -     FLUoxetine (PROzac) 10 MG capsule; Take 1 capsule by mouth Daily.  Dispense: 90 capsule; Refill: 1      Assessment & Plan  1. Bronchitis.  She reports a persistent cough and difficulty breathing, which has worsened since Friday. She has been using her inhaler frequently and took Mucinex without significant improvement. Examination reveals good air movement but confirms bronchitis. A prescription for doxycycline, to be taken twice daily for 10 days, has been issued. A steroid pack has also been prescribed to prevent further deterioration. Tessalon Perles have been recommended for nighttime use to manage the cough. She is advised to maintain adequate hydration during the day and discontinue Mucinex.    2. Anxiety.  She occasionally takes Xanax, usually half a pill of the lowest dose. A refill for alprazolam has been provided.    3. Asthma.  She reports that her current illness is exacerbating her asthma. She has been using her inhaler frequently. A refill for Singulair (montelukast) has been provided to help manage her symptoms and reduce the frequency of  illness.    4. Health maintenance.  Laboratory tests for cholesterol, thyroid function, and vitamin levels have been ordered. She is advised to complete these tests while fasting, with the exception of black coffee without cream or sugar.    Follow-up  The patient will follow up in October for a Pap smear and physical examination.     Follow Up  Return in about 6 months (around 9/3/2025) for ANNUAL PHYSICAL WITH PAP.    Patient or patient representative verbalized consent for the use of Ambient Listening during the visit with  Silvia Pryor PA-C for chart documentation. 3/3/2025  15:40 EST      Silvia Pryor PA-C   [facility] [place] [facility] [address]

## 2025-03-05 ENCOUNTER — PATIENT MESSAGE (OUTPATIENT)
Dept: INTERNAL MEDICINE | Facility: CLINIC | Age: 45
End: 2025-03-05
Payer: COMMERCIAL

## 2025-03-05 RX ORDER — ALBUTEROL SULFATE 0.83 MG/ML
2.5 SOLUTION RESPIRATORY (INHALATION) EVERY 4 HOURS PRN
Qty: 120 EACH | Refills: 2 | Status: SHIPPED | OUTPATIENT
Start: 2025-03-05

## 2025-03-07 ENCOUNTER — TELEPHONE (OUTPATIENT)
Dept: INTERNAL MEDICINE | Facility: CLINIC | Age: 45
End: 2025-03-07
Payer: COMMERCIAL

## 2025-03-07 RX ORDER — PREDNISONE 10 MG/1
20 TABLET ORAL DAILY
Qty: 14 TABLET | Refills: 0 | Status: SHIPPED | OUTPATIENT
Start: 2025-03-07

## 2025-03-07 NOTE — TELEPHONE ENCOUNTER
Caller: Alicia Diaz    Relationship: Self    Best call back number: [phone]    What was the call regarding: PATIENT WAS TAKING methylPREDNISolone (MEDROL) 4 MG dose pack BUT WAS PRESCRIBED THE predniSONE (DELTASONE) 10 MG tablet. SHE STILL HAS 2 DAYS LEFT OF THE METHLYPRED AND WANTS TO KNOW IF SHE SHOULD FINISH THAT OUT BEFORE STARTING THE PREDNISONE OR JUST DISCONTINUE THE METHYLPRED ALTHOUGH.

## 2025-03-07 NOTE — TELEPHONE ENCOUNTER
Patient called and stated she was just going to start the steroid tablets since she was about to go out of town. Once she returns she will call if she thinks she still needs a shot for the steroid medication.

## 2025-03-07 NOTE — TELEPHONE ENCOUNTER
Pt stated she wanted to speak with Dr talley MA  had a couple of questions they about what was discussed on the [facility] .

## 2025-03-18 ENCOUNTER — OFFICE VISIT (OUTPATIENT)
Dept: INTERNAL MEDICINE | Facility: CLINIC | Age: 45
End: 2025-03-18
Payer: COMMERCIAL

## 2025-03-18 VITALS
BODY MASS INDEX: 19.99 KG/M2 | HEIGHT: 65 IN | TEMPERATURE: 98 F | OXYGEN SATURATION: 99 % | DIASTOLIC BLOOD PRESSURE: 70 MMHG | SYSTOLIC BLOOD PRESSURE: 120 MMHG | WEIGHT: 120 LBS | HEART RATE: 70 BPM

## 2025-03-18 DIAGNOSIS — J40 BRONCHITIS: Primary | ICD-10-CM

## 2025-03-18 RX ORDER — FLUCONAZOLE 150 MG/1
150 TABLET ORAL ONCE
Qty: 1 TABLET | Refills: 0 | Status: SHIPPED | OUTPATIENT
Start: 2025-03-18 | End: 2025-03-18

## 2025-03-18 RX ORDER — TRIAMCINOLONE ACETONIDE 40 MG/ML
80 INJECTION, SUSPENSION INTRA-ARTICULAR; INTRAMUSCULAR ONCE
Status: COMPLETED | OUTPATIENT
Start: 2025-03-18 | End: 2025-03-18

## 2025-03-18 RX ADMIN — TRIAMCINOLONE ACETONIDE 80 MG: 40 INJECTION, SUSPENSION INTRA-ARTICULAR; INTRAMUSCULAR at 13:16

## 2025-03-18 NOTE — PATIENT INSTRUCTIONS
"BMI for Adults  Body mass index (BMI) is a number found using a person's weight and height. BMI can help tell how much of a person's weight is made up of fat. BMI does not measure body fat directly. It is used instead of tests that directly measure body fat, which can be difficult and expensive.  What are BMI measurements used for?  BMI is useful to:  Find out if your weight puts you at higher risk for medical problems.  Help recommend changes, such as in diet and exercise. This can help you reach a healthy weight. BMI screening can be done again to see if these changes are working.  How is BMI calculated?  Your height and weight are measured. The BMI is found from those numbers. This can be done with U.S[place] or metric measurements. Note that charts and online BMI calculators are available to help you find your BMI quickly and easily without doing these calculations.  To calculate your BMI in U.S[place] measurements:  Measure your weight in pounds (lb).  Multiply the number of pounds by 703.  So, for an adult who weighs 150 lb, multiply that number by 703: 150 x 703, which equals 105,450.  Measure your height in inches. Then multiply that number by itself to get a measurement called \"inches squared.\"  So, for an adult who is 70 inches tall, the \"inches squared\" measurement is 70 inches x 70 inches, which equals 4,900 inches squared.  Divide the total from step 2 (number of lb x 703) by the total from step 3 (inches squared): 105,450 ÷ 4,900 = 21.5. This is your BMI.  To calculate your BMI in metric measurements:    Measure your weight in kilograms (kg).  For this example, the weight is 70 kg.  Measure your height in meters (m). Then multiply that number by itself to get a measurement called \"meters squared.\"  So, for an adult who is 1.75 m tall, the \"meters squared\" measurement is 1.75 m x 1.75 m, which equals 3.1 meters squared.  Divide the number of kilograms (your weight) by the meters squared number. In this example: 70 " ÷ 3.1 = 22.6. This is your BMI.  What do the results mean?  BMI charts are used to see if you are underweight, normal weight, overweight, or obese. The following guidelines will be used:  Underweight: BMI less than 18.5.  Normal weight: BMI between 18.5 and 24.9.  Overweight: BMI between 25 and 29.9.  Obese: BMI of 30 or above.  BMI is a tool and cannot diagnose a condition. Talk with your health care provider about what your BMI means for you. Keep these notes in mind:  Weight includes fat and muscle. Someone with a muscular build, such as an athlete, may have a BMI that is higher than 24.9. In cases like these, BMI is not a correct measure of body fat.  If you have a BMI of 25 or higher, your provider may need to do more testing to find out if excess body fat is the cause.  BMI is measured the same way for males and females. Females usually have more body fat than males of the same height and weight.  Where to find more information  For more information about BMI, including tools to quickly find your BMI, go to:  Centers for Disease Control and Prevention: cdc.gov  American Heart Association: heart.org  National Heart, Lung, and Blood [place]: nhlbi.nih.gov  This information is not intended to replace advice given to you by your health care provider. Make sure you discuss any questions you have with your health care provider.  Document Revised: 09/07/2023 Document Reviewed: 08/31/2023  [employer] Patient Education © 2024 [employer]Advance Directive    Advance directives are legal papers that state your wishes about health care decisions. They let your wishes be known to family, friends, and health care providers if you become unable to speak for yourself.   You should write these papers out over time rather than all at once. They can be changed and updated at any time.  The types of advance directives include:  Medical power of  (POA).  Living yuen.  Do not resuscitate (DNR) or do not attempt  resuscitation (DNAR) orders.  What are a health care proxy and medical POA?  A health care proxy is also called a health care agent. It's a person you choose to make medical decisions for you when you can't make them for yourself. In most cases, a proxy is a trusted friend or family member.  A medical POA is legal paperwork that names your proxy. It may need to be:  Signed.  Notarized.  Dated.  Copied.  Witnessed.  Added to your medical record.  You may also want to choose someone to handle your money if you can't do so. This is called a durable POA for finances. It's separate from a medical POA. You may choose your health care proxy or someone else to act as your agent in money matters.  If you don't have a proxy, or if the proxy may not be acting in your best interest, a court may choose a guardian to act on your behalf.  What is a living will?  A living will is legal paperwork that states your wishes about medical care. Providers should keep a copy of it in your medical record. You may want to give a copy to family members or friends. You can also keep a card in your wallet to let loved ones know you have a living will and where they can find it. A living will may be used if:  You're very sick with something that will end your life.  You become disabled.  You can't make decisions or speak for yourself.  Your living will should include whether:  To use or not use life support equipment. This may include machines to filter your blood or to help you breathe.  You want a DNR or DNAR order. This tells providers not to use CPR if your heart or breathing stops.  To use or not use tube feeding.  You want to be given foods and fluids.  You want a type of comfort care called palliative care. This may be given when the goal for treatment becomes comfort rather than a cure.  You want to donate your organs and tissues.  A living will doesn't say what to do with your money and property if you pass away.  What is a DNR or  DNAR?  A DNR or DNAR order is a request not to have CPR. If you don't have one of these orders, a provider will try to help you if your heart stops or you stop breathing.   If you plan to have surgery, talk with your provider about your DNR or DNAR order.  What happens if I don't have an advance directive?  Each state has its own laws about advance directives. Some states assign family decision makers to act on your behalf if you don't have an advance directive.   Check with your provider, , or state representative about the laws in your state.  Where to find more information  Each state has its own laws about advance directives. You can look up these laws at: [employer].org  This information is not intended to replace advice given to you by your health care provider. Make sure you discuss any questions you have with your health care provider.  Document Revised: 05/06/2024 Document Reviewed: 05/06/2024  [employer] Patient Education © 2024 [employer]

## 2025-03-18 NOTE — PROGRESS NOTES
Office Note     Name: Ailcia Diaz    : 1980     MRN: [id number]     Chief Complaint  Persistant cough    Subjective     History of Present Illness:  Alicia Diaz is a 44 y.o. female who presents today for asthma.    History of Present Illness  The patient presents for evaluation of asthma.    She reports a persistent rattling sensation in her left lung when lying on her left side, which she describes as fluctuating in intensity. She experiences intermittent episodes of dyspnea and tightness in her chest. She has been attempting to maintain an exercise regimen but finds it challenging due to her shortness of breath. She occasionally experiences productive coughs, but the sputum is not green. She has no history of pneumonia. She has not experienced any fevers and reports a gradual improvement in her symptoms. She has not tried Mucinex recently but had used it prior to her last visit. Her current medication regimen includes Advair 250/50 twice daily, which is the highest dose, and over-the-counter Claritin for severe seasonal allergies. She has previously consulted an allergist and received allergy injections during her high school and college years but discontinued them due to perceived ineffectiveness. She has been using albuterol nebulizer treatments as needed since her last visit on 2025. She has completed a course of doxycycline and prednisone, with the latter providing some relief. She believes the second dose of prednisone 10 mg was the most beneficial, although she dislikes the associated swelling.    SOCIAL HISTORY  She does not vape or smoke.    MEDICATIONS  Advair, Claritin, albuterol, doxycycline, prednisone, Mucinex (past).    Review of Systems:   Review of Systems   Constitutional:  Negative for activity change and appetite change.   Respiratory:  Positive for chest tightness and shortness of breath. Negative for cough and wheezing.        Past Medical History:   Past  Medical History:   Diagnosis Date    Allergic     Allergy     Anxiety     Asthma     Asthma     Cancer     squamous upper abd and back    Family history of asthma     IBS (irritable bowel syndrome)     Irritable bowel syndrome     Wears eyeglasses        Past Surgical History:   Past Surgical History:   Procedure Laterality Date    BREAST SURGERY  22     SECTION Bilateral 2018    Procedure:  SECTION PRIMARY;  Surgeon: Mariam Kim DO;  Location: [facility] LABOR DELIVERY;  Service:     MASTOPEXY Bilateral 2022    Procedure: MASTOPEXY- BILATERAL;  Surgeon: Jared Tavarez MD;  Location: [facility] OR;  Service: Plastics;  Laterality: Bilateral;    SKIN BIOPSY      WIDE EXCISION LESION/MASS TRUNK      back and upper abd    WISDOM TOOTH EXTRACTION         Family History:   Family History   Problem Relation Age of Onset    Diabetes Paternal Grandmother     Asthma Other     Breast cancer Neg Hx     Endometrial cancer Neg Hx     Ovarian cancer Neg Hx        Social History:   Social History     Socioeconomic History    Marital status:    Tobacco Use    Smoking status: Never    Smokeless tobacco: Never   Vaping Use    Vaping status: Never Used   Substance and Sexual Activity    Alcohol use: Yes     Alcohol/week: 2.0 standard drinks of alcohol     Types: 2 Glasses of wine per week    Drug use: No    Sexual activity: Yes     Partners: Male     Birth control/protection: I.U.D.       Immunizations:   Immunization History   Administered Date(s) Administered    COVID-19 (PFIZER) Purple Cap Monovalent 2021, 2021, 2021    Covid-19 (Pfizer) Gray Cap Monovalent 2022    Flu Vaccine Quad PF >36MO 2016    Fluzone (or Fluarix & Flulaval for VFC) >6mos 2020, 2021, 10/04/2022, 10/09/2023    Pneumococcal Polysaccharide (PPSV23) 2018    Tdap 09/15/2014        Medications:     Current Outpatient Medications:     albuterol (PROVENTIL) (2.5 MG/3ML) 0.083%  "nebulizer solution, Take 2.5 mg by nebulization Every 4 (Four) Hours As Needed for Wheezing., Disp: 120 each, Rfl: 2    albuterol sulfate  (90 Base) MCG/ACT inhaler, Inhale 2 puffs Every 4 (Four) Hours As Needed for Wheezing., Disp: 8.5 g, Rfl: 5    ALPRAZolam (XANAX) 0.5 MG tablet, Take 1 tablet by mouth 2 (Two) Times a Day As Needed for Anxiety., Disp: 30 tablet, Rfl: 2    FLUoxetine (PROzac) 10 MG capsule, Take 1 capsule by mouth Daily., Disp: 90 capsule, Rfl: 1    Fluticasone-Salmeterol (Advair Diskus) 500-50 MCG/ACT DISKUS, Inhale 1 puff 2 (Two) Times a Day., Disp: 180 each, Rfl: 3    Levonorgestrel (MIRENA, 52 MG, IU), by Intrauterine route., Disp: , Rfl:     loratadine (CLARITIN) 10 MG tablet, Take 1 tablet by mouth Daily., Disp: , Rfl:     montelukast (Singulair) 10 MG tablet, Take 1 tablet by mouth Every Night., Disp: 90 tablet, Rfl: 1  No current facility-administered medications for this visit.    Allergies:   Allergies   Allergen Reactions    Bactrim [Sulfamethoxazole-Trimethoprim] Anaphylaxis, Hives, Rash and GI Intolerance     And fever        Objective     Vital Signs  /70 (BP Location: Left arm, Patient Position: Sitting, Cuff Size: Adult)   Pulse 70   Temp 98 °F (36.7 °C) (Infrared)   Ht 165.1 cm (65\")   Wt 54.4 kg (120 lb)   SpO2 99%   BMI 19.97 kg/m²   Body mass index is 19.97 kg/m².     BMI is within normal parameters. No other follow-up for BMI required.       Physical Exam  Pulmonary:      Breath sounds: Examination of the left-lower field reveals wheezing. Wheezing present.       Chest:              Procedures     Results:  No results found for this or any previous visit (from the past 24 hours).     Assessment and Plan     Assessment/Plan:  Diagnoses and all orders for this visit:    1. Bronchitis (Primary)  -     triamcinolone acetonide (KENALOG-40) injection 80 mg        Assessment & Plan  1. Asthma.  The patient reports persistent tightness and occasional rattling sounds " when lying on the left side, despite some improvement with prednisone. She is currently on Advair 500/50 mcg twice a day and uses albuterol for nebulizer treatments as needed. The patient has not had significant improvement with doxycycline. The effectiveness of the steroid treatment suggests it may be beneficial to continue.  She will receive a triamcinolone injection to help alleviate symptoms. She is advised to continue her current inhaler regimen and use Mucinex as needed. If her condition worsens, she should contact the office immediately and a chest x-ray will be ordered to rule out pneumonia or other underlying conditions.    Follow-up  The patient will follow up in 1 week.    Follow Up  Return if symptoms worsen or fail to improve.    Patient or patient representative verbalized consent for the use of Ambient Listening during the visit with  Silvia Pryor PA-C for chart documentation. 3/18/2025  14:03 EDT      Silvia Pryor PA-C   [facility] [place] [id number]

## 2025-04-25 DIAGNOSIS — J45.41 MODERATE PERSISTENT ASTHMATIC BRONCHITIS WITH ACUTE EXACERBATION: ICD-10-CM

## 2025-04-25 RX ORDER — ALBUTEROL SULFATE 90 UG/1
2 INHALANT RESPIRATORY (INHALATION) EVERY 4 HOURS PRN
Qty: 8.5 G | Refills: 5 | Status: SHIPPED | OUTPATIENT
Start: 2025-04-25

## 2025-04-25 NOTE — TELEPHONE ENCOUNTER
Rx Refill Note  Requested Prescriptions     Pending Prescriptions Disp Refills    albuterol sulfate  (90 Base) MCG/ACT inhaler 8.5 g 5     Sig: Inhale 2 puffs Every 4 (Four) Hours As Needed for Wheezing.      Last office visit with prescribing clinician: 3/18/2025   Last telemedicine visit with prescribing clinician: Visit date not found   Next office visit with prescribing clinician: 10/10/2025                         Would you like a call back once the refill request has been completed: [] Yes [] No    If the office needs to give you a call back, can they leave a voicemail: [] Yes [] No    Charleen Gaffney MA  04/25/25, 16:00 EDT

## 2025-05-09 RX ORDER — DESVENLAFAXINE 50 MG/1
50 TABLET, FILM COATED, EXTENDED RELEASE ORAL DAILY
Qty: 30 TABLET | Refills: 5 | Status: SHIPPED | OUTPATIENT
Start: 2025-05-09

## 2025-06-28 DIAGNOSIS — F41.9 ANXIETY: ICD-10-CM

## 2025-06-30 RX ORDER — ALPRAZOLAM 0.5 MG
0.5 TABLET ORAL 2 TIMES DAILY PRN
Qty: 30 TABLET | Refills: 2 | Status: SHIPPED | OUTPATIENT
Start: 2025-06-30

## 2025-06-30 NOTE — TELEPHONE ENCOUNTER
Rx Refill Note  Requested Prescriptions     Pending Prescriptions Disp Refills    ALPRAZolam (XANAX) 0.5 MG tablet 30 tablet 2     Sig: Take 1 tablet by mouth 2 (Two) Times a Day As Needed for Anxiety.      Last office visit with prescribing clinician: 3/18/2025   Last telemedicine visit with prescribing clinician: Visit date not found   Next office visit with prescribing clinician: 10/10/2025                         Would you like a call back once the refill request has been completed: [] Yes [] No    If the office needs to give you a call back, can they leave a voicemail: [] Yes [] No    Charleen Gaffney MA  06/30/25, 08:42 EDT

## (undated) DEVICE — MAT PREVALON MOBL TRANSFR AIR WO/PAD 39X80IN

## (undated) DEVICE — 1010 S-DRAPE TOWEL DRAPE 10/BX: Brand: STERI-DRAPE™

## (undated) DEVICE — SUT MNCRYL PLS ANTIB UD 3/0 PS2 27IN

## (undated) DEVICE — TBG PENCL TELESCP MEGADYNE SMOKE EVAC 10FT

## (undated) DEVICE — VIOLET BRAIDED (POLYGLACTIN 910), SYNTHETIC ABSORBABLE SUTURE: Brand: COATED VICRYL

## (undated) DEVICE — SUT MONOCRYL SZ 4 0 18IN PS1 Y682H BX/36

## (undated) DEVICE — BLANKT WARM LOWR/BDY 100X120CM

## (undated) DEVICE — ADHS SKIN PREMIERPRO EXOFIN TOPICAL HI/VISC .5ML

## (undated) DEVICE — INTENDED USE FOR SURGICAL MARKING ON INTACT SKIN, ALSO PROVIDES A PERMANENT METHOD OF IDENTIFYING OBJECTS IN THE OPERATING ROOM: Brand: WRITESITE® REGULAR TIP SKIN MARKER

## (undated) DEVICE — GLV SURG SENSICARE W/ALOE PF LF 7.5 STRL

## (undated) DEVICE — DRSNG PAD ABD 8X10IN STRL

## (undated) DEVICE — DRSNG SURESITE WNDW 2.38X2.75

## (undated) DEVICE — ELECTRD BLD EZ CLN MOD XLNG 2.75IN

## (undated) DEVICE — SUT GUT CHRM 2/0 CT1 27IN 811H

## (undated) DEVICE — SUT MONOCRYL PLS ANTIB UND 3/0  PS1 27IN

## (undated) DEVICE — TBG SXN LIPECTOMY 108IN

## (undated) DEVICE — SOL IRR H2O BTL 1000ML STRL

## (undated) DEVICE — TRAP FLD MINIVAC MEGADYNE 100ML

## (undated) DEVICE — SOL IRR NACL 0.9PCT BT 1000ML

## (undated) DEVICE — COATED VICRYL  (POLYGLACTIN 910) SUTURE, VIOLET BRAIDED, STERILE, SYNTHETIC ABSORBABLE SUTURE: Brand: COATED VICRYL

## (undated) DEVICE — PK CHST BRST 10

## (undated) DEVICE — TRY SPINE BLCK WHITACRE 25G 3X5IN

## (undated) DEVICE — 3M™ STERI-STRIP™ REINFORCED ADHESIVE SKIN CLOSURES, R1547, 1/2 IN X 4 IN (12 MM X 100 MM), 6 STRIPS/ENVELOPE: Brand: 3M™ STERI-STRIP™

## (undated) DEVICE — Device

## (undated) DEVICE — SUT VIC 3/0 CT1 27IN DYED J338H

## (undated) DEVICE — SPNG LAP PREWSH SFTPK 18X18IN STRL PK/5

## (undated) DEVICE — DRSNG GZ CURAD XEROFORM NONADHS 5X9IN STRL

## (undated) DEVICE — BNDG ELAS W/CLIP 6IN 10YD LF STRL

## (undated) DEVICE — SUT MNCRYL PLS ANTIB UD 4/0 PS2 18IN

## (undated) DEVICE — PATIENT RETURN ELECTRODE, SINGLE-USE, CONTACT QUALITY MONITORING, ADULT, WITH 9FT CORD, FOR PATIENTS WEIGING OVER 33LBS. (15KG): Brand: MEGADYNE

## (undated) DEVICE — UNDERGLV SURG BIOGEL INDICAT PI SZ8 BLU

## (undated) DEVICE — PK C/SECT 10

## (undated) DEVICE — PROXIMATE RH ROTATING HEAD SKIN STAPLERS (35 WIDE) CONTAINS 35 STAINLESS STEEL STAPLES: Brand: PROXIMATE

## (undated) DEVICE — ANTIBACTERIAL UNDYED BRAIDED (POLYGLACTIN 910), SYNTHETIC ABSORBABLE SUTURE: Brand: COATED VICRYL

## (undated) DEVICE — STPLR SKIN SUBCUTICULAR INSORB 2030

## (undated) DEVICE — BANDAGE,GAUZE,BULKEE II,4.5"X4.1YD,STRL: Brand: MEDLINE

## (undated) DEVICE — SPNG GZ WOVN 4X4IN 12PLY 10/BX STRL

## (undated) DEVICE — GLV SURG BIOGEL LTX PF 6 1/2